# Patient Record
Sex: FEMALE | Race: WHITE | NOT HISPANIC OR LATINO
[De-identification: names, ages, dates, MRNs, and addresses within clinical notes are randomized per-mention and may not be internally consistent; named-entity substitution may affect disease eponyms.]

---

## 2017-07-25 PROBLEM — Z00.00 ENCOUNTER FOR PREVENTIVE HEALTH EXAMINATION: Status: ACTIVE | Noted: 2017-07-25

## 2017-09-08 ENCOUNTER — APPOINTMENT (OUTPATIENT)
Dept: VASCULAR SURGERY | Facility: CLINIC | Age: 42
End: 2017-09-08
Payer: COMMERCIAL

## 2017-09-08 VITALS
SYSTOLIC BLOOD PRESSURE: 122 MMHG | DIASTOLIC BLOOD PRESSURE: 82 MMHG | HEIGHT: 64 IN | HEART RATE: 48 BPM | OXYGEN SATURATION: 100 % | BODY MASS INDEX: 28.17 KG/M2 | WEIGHT: 165 LBS

## 2017-09-08 DIAGNOSIS — Z78.9 OTHER SPECIFIED HEALTH STATUS: ICD-10-CM

## 2017-09-08 DIAGNOSIS — Z82.49 FAMILY HISTORY OF ISCHEMIC HEART DISEASE AND OTHER DISEASES OF THE CIRCULATORY SYSTEM: ICD-10-CM

## 2017-09-08 DIAGNOSIS — Z86.39 PERSONAL HISTORY OF OTHER ENDOCRINE, NUTRITIONAL AND METABOLIC DISEASE: ICD-10-CM

## 2017-09-08 DIAGNOSIS — I83.10 VARICOSE VEINS OF UNSPECIFIED LOWER EXTREMITY WITH INFLAMMATION: ICD-10-CM

## 2017-09-08 DIAGNOSIS — F17.200 NICOTINE DEPENDENCE, UNSPECIFIED, UNCOMPLICATED: ICD-10-CM

## 2017-09-08 PROCEDURE — 99203 OFFICE O/P NEW LOW 30 MIN: CPT | Mod: 25

## 2017-09-08 PROCEDURE — 93970 EXTREMITY STUDY: CPT

## 2017-09-11 PROBLEM — Z78.9 SOCIAL ALCOHOL USE: Status: ACTIVE | Noted: 2017-09-11

## 2017-09-11 PROBLEM — Z86.39 HISTORY OF HYPOTHYROIDISM: Status: RESOLVED | Noted: 2017-09-11 | Resolved: 2017-09-11

## 2017-09-11 PROBLEM — F17.200 CURRENT SOME DAY SMOKER: Status: ACTIVE | Noted: 2017-09-11

## 2017-09-11 PROBLEM — Z82.49 FAMILY HISTORY OF VARICOSE VEINS: Status: ACTIVE | Noted: 2017-09-11

## 2017-11-16 ENCOUNTER — EMERGENCY (EMERGENCY)
Facility: HOSPITAL | Age: 42
LOS: 1 days | Discharge: HOME | End: 2017-11-16
Attending: EMERGENCY MEDICINE
Payer: COMMERCIAL

## 2017-11-16 VITALS
DIASTOLIC BLOOD PRESSURE: 88 MMHG | HEART RATE: 67 BPM | WEIGHT: 164.91 LBS | TEMPERATURE: 98 F | HEIGHT: 64 IN | SYSTOLIC BLOOD PRESSURE: 139 MMHG | OXYGEN SATURATION: 99 % | RESPIRATION RATE: 19 BRPM
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
## 2017-11-16 DIAGNOSIS — M54.9 DORSALGIA, UNSPECIFIED: ICD-10-CM

## 2017-11-16 DIAGNOSIS — S22.41XA MULTIPLE FRACTURES OF RIBS, RIGHT SIDE, INITIAL ENCOUNTER FOR CLOSED FRACTURE: ICD-10-CM
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
## 2017-11-16 LAB
APPEARANCE UR: CLEAR — SIGNIFICANT CHANGE UP
BILIRUB UR-MCNC: NEGATIVE — SIGNIFICANT CHANGE UP
COLOR SPEC: YELLOW — SIGNIFICANT CHANGE UP
DIFF PNL FLD: ABNORMAL
GLUCOSE UR QL: NEGATIVE — SIGNIFICANT CHANGE UP
HCG UR QL: NEGATIVE — SIGNIFICANT CHANGE UP
KETONES UR-MCNC: NEGATIVE — SIGNIFICANT CHANGE UP
LEUKOCYTE ESTERASE UR-ACNC: ABNORMAL
NITRITE UR-MCNC: NEGATIVE — SIGNIFICANT CHANGE UP
PH UR: 6 — SIGNIFICANT CHANGE UP (ref 5–8)
PROT UR-MCNC: NEGATIVE — SIGNIFICANT CHANGE UP
SP GR SPEC: 1 — LOW (ref 1.01–1.02)
UROBILINOGEN FLD QL: NEGATIVE — SIGNIFICANT CHANGE UP

## 2017-11-16 PROCEDURE — 81025 URINE PREGNANCY TEST: CPT

## 2017-11-16 PROCEDURE — 99284 EMERGENCY DEPT VISIT MOD MDM: CPT | Mod: 25

## 2017-11-16 PROCEDURE — 71250 CT THORAX DX C-: CPT

## 2017-11-16 PROCEDURE — 81001 URINALYSIS AUTO W/SCOPE: CPT

## 2017-11-16 PROCEDURE — 71250 CT THORAX DX C-: CPT | Mod: 26

## 2017-11-16 PROCEDURE — 87086 URINE CULTURE/COLONY COUNT: CPT

## 2017-11-16 RX ORDER — ACETAMINOPHEN 500 MG
2 TABLET ORAL
Qty: 48 | Refills: 0
Start: 2017-11-16 | End: 2017-11-20

## 2017-11-16 RX ORDER — OXYCODONE AND ACETAMINOPHEN 5; 325 MG/1; MG/1
1 TABLET ORAL ONCE
Qty: 0 | Refills: 0 | Status: DISCONTINUED | OUTPATIENT
Start: 2017-11-16 | End: 2017-11-16

## 2017-11-16 RX ADMIN — OXYCODONE AND ACETAMINOPHEN 1 TABLET(S): 5; 325 TABLET ORAL at 02:17

## 2017-11-16 NOTE — ED PROVIDER NOTE - OBJECTIVE STATEMENT
41 y/o F with no significant PMHx and PSHx of  presents to ED c/o back pain s/p slip and fall in bath tub today. Pt describes pain to be sharp, worse when moving. Pt denies any head trauma, LOC, incontinence, numbness, weakness, dizziness or any other complaints. NKDA. LMP: 1 month ago

## 2017-11-17 LAB
CULTURE RESULTS: NO GROWTH — SIGNIFICANT CHANGE UP
SPECIMEN SOURCE: SIGNIFICANT CHANGE UP

## 2018-07-25 PROBLEM — I83.10 VARICOSE VEINS WITH INFLAMMATION, UNSPECIFIED LATERALITY: Status: ACTIVE | Noted: 2017-09-11

## 2019-01-23 ENCOUNTER — APPOINTMENT (OUTPATIENT)
Dept: SURGICAL ONCOLOGY | Facility: CLINIC | Age: 44
End: 2019-01-23
Payer: COMMERCIAL

## 2019-01-23 VITALS
WEIGHT: 164 LBS | DIASTOLIC BLOOD PRESSURE: 88 MMHG | OXYGEN SATURATION: 100 % | HEART RATE: 57 BPM | BODY MASS INDEX: 28 KG/M2 | SYSTOLIC BLOOD PRESSURE: 131 MMHG | HEIGHT: 64 IN

## 2019-01-23 DIAGNOSIS — F17.200 NICOTINE DEPENDENCE, UNSPECIFIED, UNCOMPLICATED: ICD-10-CM

## 2019-01-23 DIAGNOSIS — Z86.73 PERSONAL HISTORY OF TRANSIENT ISCHEMIC ATTACK (TIA), AND CEREBRAL INFARCTION W/OUT RESIDUAL DEFICITS: ICD-10-CM

## 2019-01-23 DIAGNOSIS — Z87.898 PERSONAL HISTORY OF OTHER SPECIFIED CONDITIONS: ICD-10-CM

## 2019-01-23 DIAGNOSIS — I83.899 VARICOSE VEINS OF UNSPECIFIED LOWER EXTREMITY WITH OTHER COMPLICATIONS: ICD-10-CM

## 2019-01-23 DIAGNOSIS — Z86.79 PERSONAL HISTORY OF OTHER DISEASES OF THE CIRCULATORY SYSTEM: ICD-10-CM

## 2019-01-23 DIAGNOSIS — M79.606 PAIN IN LEG, UNSPECIFIED: ICD-10-CM

## 2019-01-23 DIAGNOSIS — Z78.9 OTHER SPECIFIED HEALTH STATUS: ICD-10-CM

## 2019-01-23 PROCEDURE — 99205 OFFICE O/P NEW HI 60 MIN: CPT

## 2019-01-23 RX ORDER — MULTIVIT-MIN/FOLIC/VIT K/LYCOP 400-300MCG
25 MCG TABLET ORAL
Refills: 0 | Status: ACTIVE | COMMUNITY

## 2019-01-23 RX ORDER — LEVOTHYROXINE SODIUM 150 UG/1
150 TABLET ORAL
Refills: 0 | Status: ACTIVE | COMMUNITY

## 2019-01-23 NOTE — REASON FOR VISIT
[Initial Consultation] : an initial consultation for [Abnormal Mammogram] : abnormal mammogram [Spouse] : spouse

## 2019-01-23 NOTE — ASSESSMENT
[FreeTextEntry1] : Impression:\par 0.6cm mass Right breast 2:00 on mammogram and sonogram\par \par \par Plan:\par US guided biopsy of Right breast mass\par

## 2019-01-23 NOTE — CONSULT LETTER
[Dear  ___] : Dear  [unfilled], [Consult Letter:] : I had the pleasure of evaluating your patient, [unfilled]. [Please see my note below.] : Please see my note below. [Consult Closing:] : Thank you very much for allowing me to participate in the care of this patient.  If you have any questions, please do not hesitate to contact me. [Sincerely,] : Sincerely, [FreeTextEntry1] : I will keep you informed of the pathology of the biopsy. [FreeTextEntry3] : Lei Lucero MD FACS\par Chief of Surgical Oncology\par \par

## 2019-01-23 NOTE — PHYSICAL EXAM
[Normal] : supple, no neck mass and thyroid not enlarged [Normal Supraclavicular Lymph Nodes] : normal supraclavicular lymph nodes [Normal Axillary Lymph Nodes] : normal axillary lymph nodes [Normal] : oriented to person, place and time, with appropriate affect [de-identified] : Fibrocystic changes but no masses.  [FreeTextEntry1] : Deferred

## 2019-01-23 NOTE — HISTORY OF PRESENT ILLNESS
[de-identified] : Ms. Tsai is a 43 year old female who presents today for an initial consultation.  \par \par She went for her annual breast imaging including mammo/sono 2018 and was noted with a 0.6cm irregular nodular density at the 2:00 position Right breast for which US guided biopsy was recommended (Birads 4).  She has yet to undergo the biopsy.  Today she is without any complaints. Denies palpable breast masses, nipple discharge, skin changes, inversion or breast pain. Denies constitutional symptoms.\par  \par PMH otherwise significant for hypothyroidism.\par Denies family history of breast or ovarian cancer.  Father with history of gastric cancer.\par \par Menarche: 10 y.o.\par  \par Age at first pregnancy: 26 y.o.\par \par Internist: Dr. Clive Rudd

## 2019-01-30 ENCOUNTER — RESULT REVIEW (OUTPATIENT)
Age: 44
End: 2019-01-30

## 2019-01-30 ENCOUNTER — OUTPATIENT (OUTPATIENT)
Dept: OUTPATIENT SERVICES | Facility: HOSPITAL | Age: 44
LOS: 1 days | End: 2019-01-30
Payer: COMMERCIAL

## 2019-01-30 ENCOUNTER — APPOINTMENT (OUTPATIENT)
Dept: ULTRASOUND IMAGING | Facility: CLINIC | Age: 44
End: 2019-01-30

## 2019-01-30 DIAGNOSIS — Z00.8 ENCOUNTER FOR OTHER GENERAL EXAMINATION: ICD-10-CM

## 2019-01-30 PROCEDURE — A4648: CPT

## 2019-01-30 PROCEDURE — 77065 DX MAMMO INCL CAD UNI: CPT

## 2019-01-30 PROCEDURE — 77065 DX MAMMO INCL CAD UNI: CPT | Mod: 26,RT

## 2019-01-30 PROCEDURE — 19083 BX BREAST 1ST LESION US IMAG: CPT | Mod: RT

## 2019-01-30 PROCEDURE — 19083 BX BREAST 1ST LESION US IMAG: CPT

## 2019-09-26 NOTE — ED ADULT TRIAGE NOTE - CCCP TRG CHIEF CMPLNT
[employer]  or [employer]  The above information is an  only  It is not intended as medical advice for individual conditions or treatments  Talk to your doctor, nurse or pharmacist before following any medical regimen to see if it is safe and effective for you  s/p fall hit her right side/back at the tub no loc/fall

## 2020-01-16 NOTE — ED ADULT NURSE NOTE - NS ED NOTE ABUSE SUSPICION NEGLECT YN
From: Padmini Pearson  To: Srikanth Mark MD  Sent: 1/14/2020 6:37 PM CST  Subject: After-Visit Question    Could I get a valved holding chamber VHC spacer to use with my inhaler ? it was suggested by [facility]. Does that just get picked up at the pharmacy if you call it in?     Thank you!    Rebecca Pearson   No

## 2020-02-14 ENCOUNTER — APPOINTMENT (OUTPATIENT)
Dept: MAMMOGRAPHY | Facility: IMAGING CENTER | Age: 45
End: 2020-02-14
Payer: COMMERCIAL

## 2020-02-14 ENCOUNTER — OUTPATIENT (OUTPATIENT)
Dept: OUTPATIENT SERVICES | Facility: HOSPITAL | Age: 45
LOS: 1 days | End: 2020-02-14
Payer: COMMERCIAL

## 2020-02-14 ENCOUNTER — APPOINTMENT (OUTPATIENT)
Dept: ULTRASOUND IMAGING | Facility: IMAGING CENTER | Age: 45
End: 2020-02-14
Payer: COMMERCIAL

## 2020-02-14 DIAGNOSIS — Z00.8 ENCOUNTER FOR OTHER GENERAL EXAMINATION: ICD-10-CM

## 2020-02-14 PROCEDURE — G0279: CPT | Mod: 26

## 2020-02-14 PROCEDURE — 77067 SCR MAMMO BI INCL CAD: CPT

## 2020-02-14 PROCEDURE — 76641 ULTRASOUND BREAST COMPLETE: CPT | Mod: 26,50

## 2020-02-14 PROCEDURE — 76641 ULTRASOUND BREAST COMPLETE: CPT

## 2020-02-14 PROCEDURE — 77066 DX MAMMO INCL CAD BI: CPT

## 2020-02-14 PROCEDURE — 77063 BREAST TOMOSYNTHESIS BI: CPT

## 2020-02-14 PROCEDURE — 77066 DX MAMMO INCL CAD BI: CPT | Mod: 26

## 2020-02-14 PROCEDURE — G0279: CPT

## 2020-02-15 ENCOUNTER — TRANSCRIPTION ENCOUNTER (OUTPATIENT)
Age: 45
End: 2020-02-15

## 2020-02-24 ENCOUNTER — OUTPATIENT (OUTPATIENT)
Dept: OUTPATIENT SERVICES | Facility: HOSPITAL | Age: 45
LOS: 1 days | End: 2020-02-24
Payer: COMMERCIAL

## 2020-02-24 ENCOUNTER — APPOINTMENT (OUTPATIENT)
Dept: MAMMOGRAPHY | Facility: IMAGING CENTER | Age: 45
End: 2020-02-24
Payer: COMMERCIAL

## 2020-02-24 ENCOUNTER — RESULT REVIEW (OUTPATIENT)
Age: 45
End: 2020-02-24

## 2020-02-24 ENCOUNTER — APPOINTMENT (OUTPATIENT)
Dept: ULTRASOUND IMAGING | Facility: IMAGING CENTER | Age: 45
End: 2020-02-24
Payer: COMMERCIAL

## 2020-02-24 DIAGNOSIS — Z00.8 ENCOUNTER FOR OTHER GENERAL EXAMINATION: ICD-10-CM

## 2020-02-24 PROCEDURE — 88305 TISSUE EXAM BY PATHOLOGIST: CPT | Mod: 26

## 2020-02-24 PROCEDURE — 88173 CYTOPATH EVAL FNA REPORT: CPT | Mod: 26

## 2020-02-24 PROCEDURE — 88305 TISSUE EXAM BY PATHOLOGIST: CPT

## 2020-02-24 PROCEDURE — 19000 PUNCTURE ASPIR CYST BREAST: CPT

## 2020-02-24 PROCEDURE — 19000 PUNCTURE ASPIR CYST BREAST: CPT | Mod: LT

## 2020-02-24 PROCEDURE — 76942 ECHO GUIDE FOR BIOPSY: CPT

## 2020-02-24 PROCEDURE — 76942 ECHO GUIDE FOR BIOPSY: CPT | Mod: 26

## 2020-02-24 PROCEDURE — 19001 PUNCTURE ASPIR CYST BRST EA: CPT

## 2020-02-24 PROCEDURE — 88173 CYTOPATH EVAL FNA REPORT: CPT

## 2020-11-04 ENCOUNTER — APPOINTMENT (OUTPATIENT)
Dept: ULTRASOUND IMAGING | Facility: CLINIC | Age: 45
End: 2020-11-04

## 2020-11-04 ENCOUNTER — APPOINTMENT (OUTPATIENT)
Dept: ULTRASOUND IMAGING | Facility: CLINIC | Age: 45
End: 2020-11-04
Payer: COMMERCIAL

## 2020-11-04 ENCOUNTER — OUTPATIENT (OUTPATIENT)
Dept: OUTPATIENT SERVICES | Facility: HOSPITAL | Age: 45
LOS: 1 days | End: 2020-11-04
Payer: COMMERCIAL

## 2020-11-04 DIAGNOSIS — E04.1 NONTOXIC SINGLE THYROID NODULE: ICD-10-CM

## 2020-11-04 DIAGNOSIS — Z00.8 ENCOUNTER FOR OTHER GENERAL EXAMINATION: ICD-10-CM

## 2020-11-04 DIAGNOSIS — E07.9 DISORDER OF THYROID, UNSPECIFIED: ICD-10-CM

## 2020-11-04 PROCEDURE — 77065 DX MAMMO INCL CAD UNI: CPT

## 2020-11-04 PROCEDURE — G0279: CPT

## 2020-11-04 PROCEDURE — 76536 US EXAM OF HEAD AND NECK: CPT

## 2020-11-04 PROCEDURE — 77065 DX MAMMO INCL CAD UNI: CPT | Mod: 26,LT

## 2020-11-04 PROCEDURE — G0279: CPT | Mod: 26

## 2020-11-04 PROCEDURE — 76642 ULTRASOUND BREAST LIMITED: CPT | Mod: 26,LT

## 2020-11-04 PROCEDURE — 76536 US EXAM OF HEAD AND NECK: CPT | Mod: 26

## 2020-11-04 PROCEDURE — 76642 ULTRASOUND BREAST LIMITED: CPT

## 2020-11-10 ENCOUNTER — APPOINTMENT (OUTPATIENT)
Dept: ULTRASOUND IMAGING | Facility: CLINIC | Age: 45
End: 2020-11-10

## 2020-11-10 ENCOUNTER — OUTPATIENT (OUTPATIENT)
Dept: OUTPATIENT SERVICES | Facility: HOSPITAL | Age: 45
LOS: 1 days | End: 2020-11-10
Payer: COMMERCIAL

## 2020-11-10 ENCOUNTER — RESULT REVIEW (OUTPATIENT)
Age: 45
End: 2020-11-10

## 2020-11-10 ENCOUNTER — APPOINTMENT (OUTPATIENT)
Dept: MAMMOGRAPHY | Facility: CLINIC | Age: 45
End: 2020-11-10
Payer: COMMERCIAL

## 2020-11-10 DIAGNOSIS — R92.8 OTHER ABNORMAL AND INCONCLUSIVE FINDINGS ON DIAGNOSTIC IMAGING OF BREAST: ICD-10-CM

## 2020-11-10 PROCEDURE — A4648: CPT

## 2020-11-10 PROCEDURE — 88305 TISSUE EXAM BY PATHOLOGIST: CPT

## 2020-11-10 PROCEDURE — 77065 DX MAMMO INCL CAD UNI: CPT | Mod: 26,LT

## 2020-11-10 PROCEDURE — 19081 BX BREAST 1ST LESION STRTCTC: CPT | Mod: LT

## 2020-11-10 PROCEDURE — 88305 TISSUE EXAM BY PATHOLOGIST: CPT | Mod: 26

## 2020-11-10 PROCEDURE — 77065 DX MAMMO INCL CAD UNI: CPT

## 2020-11-10 PROCEDURE — 19081 BX BREAST 1ST LESION STRTCTC: CPT

## 2020-11-16 ENCOUNTER — APPOINTMENT (OUTPATIENT)
Dept: SURGICAL ONCOLOGY | Facility: CLINIC | Age: 45
End: 2020-11-16
Payer: COMMERCIAL

## 2020-11-16 VITALS
RESPIRATION RATE: 15 BRPM | OXYGEN SATURATION: 99 % | BODY MASS INDEX: 28.34 KG/M2 | WEIGHT: 166 LBS | SYSTOLIC BLOOD PRESSURE: 125 MMHG | DIASTOLIC BLOOD PRESSURE: 86 MMHG | HEART RATE: 70 BPM | HEIGHT: 64 IN

## 2020-11-16 PROCEDURE — 99072 ADDL SUPL MATRL&STAF TM PHE: CPT

## 2020-11-16 PROCEDURE — 99214 OFFICE O/P EST MOD 30 MIN: CPT

## 2020-11-16 NOTE — ASSESSMENT
[FreeTextEntry1] : Impression:\par Increasing Left breast calcifications on breast imaging\par S/p stereotactic core bx 11/5/2020- Path: ADH\par \par \par Plan:\par Left ute  localized lumpectomy \par

## 2020-11-16 NOTE — CONSULT LETTER
[Dear  ___] : Dear  [unfilled], [Courtesy Letter:] : I had the pleasure of seeing your patient, [unfilled], in my office today. [Please see my note below.] : Please see my note below. [Consult Closing:] : Thank you very much for allowing me to participate in the care of this patient.  If you have any questions, please do not hesitate to contact me. [Sincerely,] : Sincerely, [FreeTextEntry1] : I will keep you informed of the final pathology. [FreeTextEntry3] : Lei Lucero MD FACS\par Chief of Surgical Oncology\par \par

## 2020-11-16 NOTE — HISTORY OF PRESENT ILLNESS
[de-identified] : Ms. Tsai is a 45 year old female who presents today for a follow up visit. \par \par She was seen in initial consultation in 2019 for a right breast mass.    She went for her annual breast imaging including mammo/sono 2018 and was noted with a 0.6cm irregular nodular density at the 2:00 position Right breast for which US guided biopsy was recommended (Birads 4).   She is s/p right breast 2:00, 3cmfn US guided core biopsy on 2020- Path: Benign breast tissue with dense fibrosis.   \par \par Bilateral mammogram/sonogram 2020- IMPRESSION:  1). Developing nodular asymmetry far upper posterior left breast, likely corresponding to one of the new deeply located complicated cyst left breast on ultrasound at 1-2:00 or 2:00. Further evaluation by ultrasound-guided cyst aspirations and postprocedure mammogram to ascertain correlation is recommended. If correlation is not established, then tomosynthesis guided stereotactic core biopsy is recommended for the mammographic asymmetry.  2) Probable benign loosely grouped calcifications left upper outer posterior breast, for which a six-month follow-up left diagnostic mammogram recommended to ascertain radiographic stability.  3.) Probable benign cluster of microcysts left breast at 2:00, for which a six-month follow-up ultrasound recommended to ascertain stability.  RECOMMENDATION:  Ultrasound biopsy.  BI-RADS 4A  - Suspicious Finding(s) -Low Suspicion for Malignancy\par \par  She is s/p left breast 2:00 FNA on 2020 -Path: Negative for malignant cells; compatible with apocrine metaplastic cyst contents. \par \par Diagnostic left mammogram/sonogram 2020- Interval increase in calcifications within the deep posterior left upper outer quadrant, biopsy is advised.  BIRADS 4A\par \par  Pt. is s/p left posterior upper outer breast stereotactic core biopsy on 11/10/2020.  Pathology revealed Atypical ductal hyperplasia (ADH) in background of columnar cell change and columnar cell hyperplasia associated with calcifications.  Fibrocystic changes including stromal fibrosis, microcyst formation and adenosis associated with microcalcifications.  \par \par Denies palpable breast masses or nipple discharge. \par \par PERTINENT HISTORY:\par  PMH otherwise significant for hypothyroidism.\par Denies family history of breast or ovarian cancer.  Father with history of gastric cancer.\par \par Menarche: 10 y.o.\par  \par Age at first pregnancy: 26 y.o.\par \par Internist: Dr. Clive Rudd

## 2020-11-16 NOTE — PHYSICAL EXAM
[Normal] : supple, no neck mass and thyroid not enlarged [Normal Supraclavicular Lymph Nodes] : normal supraclavicular lymph nodes [Normal Axillary Lymph Nodes] : normal axillary lymph nodes [Normal] : oriented to person, place and time, with appropriate affect [de-identified] : Fibrocystic changes but no massses

## 2020-11-24 ENCOUNTER — OUTPATIENT (OUTPATIENT)
Dept: OUTPATIENT SERVICES | Facility: HOSPITAL | Age: 45
LOS: 1 days | End: 2020-11-24

## 2020-11-24 VITALS
HEART RATE: 64 BPM | DIASTOLIC BLOOD PRESSURE: 80 MMHG | OXYGEN SATURATION: 98 % | SYSTOLIC BLOOD PRESSURE: 122 MMHG | WEIGHT: 179.02 LBS | TEMPERATURE: 98 F | RESPIRATION RATE: 20 BRPM | HEIGHT: 64 IN

## 2020-11-24 DIAGNOSIS — Z98.890 OTHER SPECIFIED POSTPROCEDURAL STATES: Chronic | ICD-10-CM

## 2020-11-24 DIAGNOSIS — H60.92 UNSPECIFIED OTITIS EXTERNA, LEFT EAR: ICD-10-CM

## 2020-11-24 DIAGNOSIS — N60.92 UNSPECIFIED BENIGN MAMMARY DYSPLASIA OF LEFT BREAST: ICD-10-CM

## 2020-11-24 DIAGNOSIS — E66.9 OBESITY, UNSPECIFIED: Chronic | ICD-10-CM

## 2020-11-24 DIAGNOSIS — Z98.891 HISTORY OF UTERINE SCAR FROM PREVIOUS SURGERY: Chronic | ICD-10-CM

## 2020-11-24 DIAGNOSIS — Z87.59 PERSONAL HISTORY OF OTHER COMPLICATIONS OF PREGNANCY, CHILDBIRTH AND THE PUERPERIUM: Chronic | ICD-10-CM

## 2020-11-24 DIAGNOSIS — N63.20 UNSPECIFIED LUMP IN THE LEFT BREAST, UNSPECIFIED QUADRANT: ICD-10-CM

## 2020-11-24 LAB
ANION GAP SERPL CALC-SCNC: 10 MMO/L — SIGNIFICANT CHANGE UP (ref 7–14)
BUN SERPL-MCNC: 14 MG/DL — SIGNIFICANT CHANGE UP (ref 7–23)
CALCIUM SERPL-MCNC: 9.5 MG/DL — SIGNIFICANT CHANGE UP (ref 8.4–10.5)
CHLORIDE SERPL-SCNC: 101 MMOL/L — SIGNIFICANT CHANGE UP (ref 98–107)
CO2 SERPL-SCNC: 28 MMOL/L — SIGNIFICANT CHANGE UP (ref 22–31)
CREAT SERPL-MCNC: 0.76 MG/DL — SIGNIFICANT CHANGE UP (ref 0.5–1.3)
GLUCOSE SERPL-MCNC: 68 MG/DL — LOW (ref 70–99)
HCG UR-SCNC: NEGATIVE — SIGNIFICANT CHANGE UP
HCT VFR BLD CALC: 44.7 % — SIGNIFICANT CHANGE UP (ref 34.5–45)
HGB BLD-MCNC: 14.7 G/DL — SIGNIFICANT CHANGE UP (ref 11.5–15.5)
MCHC RBC-ENTMCNC: 28.5 PG — SIGNIFICANT CHANGE UP (ref 27–34)
MCHC RBC-ENTMCNC: 32.9 % — SIGNIFICANT CHANGE UP (ref 32–36)
MCV RBC AUTO: 86.8 FL — SIGNIFICANT CHANGE UP (ref 80–100)
NRBC # FLD: 0 K/UL — SIGNIFICANT CHANGE UP (ref 0–0)
PLATELET # BLD AUTO: 392 K/UL — SIGNIFICANT CHANGE UP (ref 150–400)
PMV BLD: 9.9 FL — SIGNIFICANT CHANGE UP (ref 7–13)
POTASSIUM SERPL-MCNC: 3.7 MMOL/L — SIGNIFICANT CHANGE UP (ref 3.5–5.3)
POTASSIUM SERPL-SCNC: 3.7 MMOL/L — SIGNIFICANT CHANGE UP (ref 3.5–5.3)
RBC # BLD: 5.15 M/UL — SIGNIFICANT CHANGE UP (ref 3.8–5.2)
RBC # FLD: 12.2 % — SIGNIFICANT CHANGE UP (ref 10.3–14.5)
SODIUM SERPL-SCNC: 139 MMOL/L — SIGNIFICANT CHANGE UP (ref 135–145)
SP GR UR: 1.02 — SIGNIFICANT CHANGE UP (ref 1–1.04)
WBC # BLD: 7.57 K/UL — SIGNIFICANT CHANGE UP (ref 3.8–10.5)
WBC # FLD AUTO: 7.57 K/UL — SIGNIFICANT CHANGE UP (ref 3.8–10.5)

## 2020-11-24 RX ORDER — LEVOTHYROXINE SODIUM 125 MCG
0 TABLET ORAL
Qty: 0 | Refills: 0 | DISCHARGE

## 2020-11-24 NOTE — H&P PST ADULT - NSANTHOSAYNRD_GEN_A_CORE
No. ADELINA screening performed.  STOP BANG Legend: 0-2 = LOW Risk; 3-4 = INTERMEDIATE Risk; 5-8 = HIGH Risk

## 2020-11-24 NOTE — H&P PST ADULT - NSICDXPROBLEM_GEN_ALL_CORE_FT
PROBLEM DIAGNOSES  Problem: Left breast mass  Assessment and Plan: Pt. is scheduled for left breast ute  localized lumpectomy 12/2/20.  Pt. verbalized understanding of instructions and that Chlorhexidine is for external use.  Pt. to call Surgeon's office to schedule COVID test.

## 2020-11-24 NOTE — H&P PST ADULT - NSICDXPASTSURGICALHX_GEN_ALL_CORE_FT
PAST SURGICAL HISTORY:  H/O 2 abortions ,     H/O  section 2006    H/O right breast biopsy 2019    S/P fine needle aspiration left breast-2020     PAST SURGICAL HISTORY:  H/O 2 abortions ,     H/O  section 2006    H/O right breast biopsy 2019    Obese     S/P fine needle aspiration left breast-2020

## 2020-11-24 NOTE — H&P PST ADULT - NSICDXPASTMEDICALHX_GEN_ALL_CORE_FT
PAST MEDICAL HISTORY:  Hypothyroidism      PAST MEDICAL HISTORY:  Hypothyroidism     Left breast mass

## 2020-11-29 ENCOUNTER — APPOINTMENT (OUTPATIENT)
Dept: DISASTER EMERGENCY | Facility: CLINIC | Age: 45
End: 2020-11-29

## 2020-11-29 DIAGNOSIS — Z01.818 ENCOUNTER FOR OTHER PREPROCEDURAL EXAMINATION: ICD-10-CM

## 2020-11-30 ENCOUNTER — OUTPATIENT (OUTPATIENT)
Dept: OUTPATIENT SERVICES | Facility: HOSPITAL | Age: 45
LOS: 1 days | End: 2020-11-30
Payer: COMMERCIAL

## 2020-11-30 ENCOUNTER — APPOINTMENT (OUTPATIENT)
Dept: MAMMOGRAPHY | Facility: IMAGING CENTER | Age: 45
End: 2020-11-30
Payer: COMMERCIAL

## 2020-11-30 ENCOUNTER — RESULT REVIEW (OUTPATIENT)
Age: 45
End: 2020-11-30

## 2020-11-30 DIAGNOSIS — Z98.891 HISTORY OF UTERINE SCAR FROM PREVIOUS SURGERY: Chronic | ICD-10-CM

## 2020-11-30 DIAGNOSIS — Z87.59 PERSONAL HISTORY OF OTHER COMPLICATIONS OF PREGNANCY, CHILDBIRTH AND THE PUERPERIUM: Chronic | ICD-10-CM

## 2020-11-30 DIAGNOSIS — E66.9 OBESITY, UNSPECIFIED: Chronic | ICD-10-CM

## 2020-11-30 DIAGNOSIS — Z00.8 ENCOUNTER FOR OTHER GENERAL EXAMINATION: ICD-10-CM

## 2020-11-30 DIAGNOSIS — Z98.890 OTHER SPECIFIED POSTPROCEDURAL STATES: Chronic | ICD-10-CM

## 2020-11-30 PROBLEM — E03.9 HYPOTHYROIDISM, UNSPECIFIED: Chronic | Status: ACTIVE | Noted: 2020-11-24

## 2020-11-30 PROBLEM — N63.20 UNSPECIFIED LUMP IN THE LEFT BREAST, UNSPECIFIED QUADRANT: Chronic | Status: ACTIVE | Noted: 2020-11-24

## 2020-11-30 LAB — SARS-COV-2 N GENE NPH QL NAA+PROBE: NOT DETECTED

## 2020-11-30 PROCEDURE — 19281 PERQ DEVICE BREAST 1ST IMAG: CPT | Mod: LT

## 2020-11-30 PROCEDURE — 19281 PERQ DEVICE BREAST 1ST IMAG: CPT

## 2020-11-30 PROCEDURE — C1739: CPT

## 2020-12-01 ENCOUNTER — TRANSCRIPTION ENCOUNTER (OUTPATIENT)
Age: 45
End: 2020-12-01

## 2020-12-01 VITALS
OXYGEN SATURATION: 100 % | SYSTOLIC BLOOD PRESSURE: 119 MMHG | DIASTOLIC BLOOD PRESSURE: 70 MMHG | RESPIRATION RATE: 17 BRPM | HEART RATE: 63 BPM | HEIGHT: 64 IN | TEMPERATURE: 98 F | WEIGHT: 179.02 LBS

## 2020-12-02 ENCOUNTER — OUTPATIENT (OUTPATIENT)
Dept: OUTPATIENT SERVICES | Facility: HOSPITAL | Age: 45
LOS: 1 days | Discharge: HOME | End: 2020-12-02
Payer: COMMERCIAL

## 2020-12-02 ENCOUNTER — RESULT REVIEW (OUTPATIENT)
Age: 45
End: 2020-12-02

## 2020-12-02 ENCOUNTER — OUTPATIENT (OUTPATIENT)
Dept: OUTPATIENT SERVICES | Facility: HOSPITAL | Age: 45
LOS: 1 days | End: 2020-12-02
Payer: COMMERCIAL

## 2020-12-02 ENCOUNTER — APPOINTMENT (OUTPATIENT)
Dept: SURGICAL ONCOLOGY | Facility: AMBULATORY SURGERY CENTER | Age: 45
End: 2020-12-02

## 2020-12-02 ENCOUNTER — APPOINTMENT (OUTPATIENT)
Dept: MAMMOGRAPHY | Facility: IMAGING CENTER | Age: 45
End: 2020-12-02
Payer: COMMERCIAL

## 2020-12-02 VITALS — HEART RATE: 72 BPM | OXYGEN SATURATION: 98 % | RESPIRATION RATE: 16 BRPM

## 2020-12-02 DIAGNOSIS — N60.92 UNSPECIFIED BENIGN MAMMARY DYSPLASIA OF LEFT BREAST: ICD-10-CM

## 2020-12-02 DIAGNOSIS — Z98.890 OTHER SPECIFIED POSTPROCEDURAL STATES: Chronic | ICD-10-CM

## 2020-12-02 DIAGNOSIS — R92.8 OTHER ABNORMAL AND INCONCLUSIVE FINDINGS ON DIAGNOSTIC IMAGING OF BREAST: ICD-10-CM

## 2020-12-02 DIAGNOSIS — E66.9 OBESITY, UNSPECIFIED: Chronic | ICD-10-CM

## 2020-12-02 DIAGNOSIS — Z87.59 PERSONAL HISTORY OF OTHER COMPLICATIONS OF PREGNANCY, CHILDBIRTH AND THE PUERPERIUM: Chronic | ICD-10-CM

## 2020-12-02 DIAGNOSIS — Z98.891 HISTORY OF UTERINE SCAR FROM PREVIOUS SURGERY: Chronic | ICD-10-CM

## 2020-12-02 PROCEDURE — 76098 X-RAY EXAM SURGICAL SPECIMEN: CPT

## 2020-12-02 PROCEDURE — 88305 TISSUE EXAM BY PATHOLOGIST: CPT | Mod: 26

## 2020-12-02 PROCEDURE — 76098 X-RAY EXAM SURGICAL SPECIMEN: CPT | Mod: 26

## 2020-12-02 PROCEDURE — 88307 TISSUE EXAM BY PATHOLOGIST: CPT | Mod: 26

## 2020-12-02 PROCEDURE — 19301 PARTIAL MASTECTOMY: CPT

## 2020-12-02 RX ORDER — LEVOTHYROXINE SODIUM 125 MCG
1 TABLET ORAL
Qty: 0 | Refills: 0 | DISCHARGE

## 2020-12-02 RX ORDER — ASCORBIC ACID 60 MG
2 TABLET,CHEWABLE ORAL
Qty: 0 | Refills: 0 | DISCHARGE

## 2020-12-02 RX ORDER — CHOLECALCIFEROL (VITAMIN D3) 125 MCG
1 CAPSULE ORAL
Qty: 0 | Refills: 0 | DISCHARGE

## 2020-12-02 NOTE — ASU PREOP CHECKLIST - VIA
Lab Hours:  Monday-Thursday 7-6  Friday 7-5  Saturday 7-12  Do not eat any food or drink any beverages for 12 hours before having the testing done. You may have water only; NO candy, gum, mints, coffee, soda or juice. Please take all medications as usual. Do not drink any alcoholic beverages for 24 hours prior to testing. If your physician has ordered laboratory or radiological testing as a part of your ongoing plan of care, please allow 5-7 business days for the results to be sent and reviewed by your provider. You may receive test results in [facility] before your physician has had a chance to review them. If your results are critical and require more immediate intervention, you will be contacted sooner. Your results will be conveyed via phone call or letter. If you need a refill on your prescription, please call your pharmacy and let them know. Please be proactive and call before your medication runs out. The pharmacy will then contact us for a refill. Please allow 24-48 hours for the refill to be processed. In the next few weeks you may receive a [employer] survey regarding your most recent clinic visit with us. Please take a few moments out of your day to accurately evaluate your visit. We strive to provide you with the best medical care. Again, thank you for your time and we look forward to your next visit. If we need to contact you regarding any test results, we will make 2 attempts to reach you at the number you have listed during your office visit today. If we are unable to reach you, a letter with your results and any further instructions will be mailed to your home.
ambulate

## 2020-12-02 NOTE — ASU DISCHARGE PLAN (ADULT/PEDIATRIC) - CALL YOUR DOCTOR IF YOU HAVE ANY OF THE FOLLOWING:
Bleeding that does not stop/Swelling that gets worse/Pain not relieved by Medications Swelling that gets worse/Fever greater than (need to indicate Fahrenheit or Celsius)/Bleeding that does not stop/Pain not relieved by Medications

## 2020-12-02 NOTE — ASU DISCHARGE PLAN (ADULT/PEDIATRIC) - CARE PROVIDER_API CALL
Lei Lucero  SURGERY  [address] [place] [address], [place] [id number]  Phone: [phone]  Fax: [phone]  Established Patient  Follow Up Time: 2 weeks

## 2020-12-29 ENCOUNTER — APPOINTMENT (OUTPATIENT)
Dept: SURGICAL ONCOLOGY | Facility: CLINIC | Age: 45
End: 2020-12-29
Payer: COMMERCIAL

## 2020-12-29 VITALS
BODY MASS INDEX: 28.34 KG/M2 | HEIGHT: 64 IN | DIASTOLIC BLOOD PRESSURE: 86 MMHG | WEIGHT: 166 LBS | OXYGEN SATURATION: 99 % | TEMPERATURE: 97.1 F | SYSTOLIC BLOOD PRESSURE: 137 MMHG | HEART RATE: 102 BPM

## 2020-12-29 PROCEDURE — 99024 POSTOP FOLLOW-UP VISIT: CPT

## 2020-12-29 NOTE — CONSULT LETTER
[Dear  ___] : Dear  [unfilled], [Courtesy Letter:] : I had the pleasure of seeing your patient, [unfilled], in my office today. [Please see my note below.] : Please see my note below. [Consult Closing:] : Thank you very much for allowing me to participate in the care of this patient.  If you have any questions, please do not hesitate to contact me. [Sincerely,] : Sincerely, [FreeTextEntry1] : i will keep you informed of my follow-up. [FreeTextEntry3] : Lei Lucero MD FACS\par Chief of Surgical Oncology\par \par

## 2020-12-29 NOTE — HISTORY OF PRESENT ILLNESS
[de-identified] : Ms. Tsai is a 45 year old female who presents today for a initial post-op visit. She is s/p left breast YANELY- localized lumpectomy on 20. Final pathology revealed proliferative fibrocystic changes including columnar cell hyperplasia, columnar cell change, stromal fibrosis, microcystformation, adenosis, fibroadenomatoid change and pseudoangiomatous stromal hyperplasia (PASH).\par \par She was seen in initial consultation in 2019 for a right breast mass.    She went for her annual breast imaging including mammo/sono 2018 and was noted with a 0.6cm irregular nodular density at the 2:00 position Right breast for which US guided biopsy was recommended (Birads 4).   She is s/p right breast 2:00, 3cmfn US guided core biopsy on 2020- Path: Benign breast tissue with dense fibrosis.   \par \par Bilateral mammogram/sonogram 2020- IMPRESSION:  1). Developing nodular asymmetry far upper posterior left breast, likely corresponding to one of the new deeply located complicated cyst left breast on ultrasound at 1-2:00 or 2:00. Further evaluation by ultrasound-guided cyst aspirations and postprocedure mammogram to ascertain correlation is recommended. If correlation is not established, then tomosynthesis guided stereotactic core biopsy is recommended for the mammographic asymmetry.  2) Probable benign loosely grouped calcifications left upper outer posterior breast, for which a six-month follow-up left diagnostic mammogram recommended to ascertain radiographic stability.  3.) Probable benign cluster of microcysts left breast at 2:00, for which a six-month follow-up ultrasound recommended to ascertain stability.  RECOMMENDATION:  Ultrasound biopsy.  BI-RADS 4A  - Suspicious Finding(s) -Low Suspicion for Malignancy\par \par  She is s/p left breast 2:00 FNA on 2020 -Path: Negative for malignant cells; compatible with apocrine metaplastic cyst contents. \par \par Diagnostic left mammogram/sonogram 2020- Interval increase in calcifications within the deep posterior left upper outer quadrant, biopsy is advised.  BIRADS 4A\par \par  Pt. is s/p left posterior upper outer breast stereotactic core biopsy on 11/10/2020.  Pathology revealed Atypical ductal hyperplasia (ADH) in background of columnar cell change and columnar cell hyperplasia associated with calcifications.  Fibrocystic changes including stromal fibrosis, microcyst formation and adenosis associated with microcalcifications.  \par \par PERTINENT HISTORY:\par  PMH otherwise significant for hypothyroidism.\par Denies family history of breast or ovarian cancer.  Father with history of gastric cancer.\par \par Menarche: 10 y.o.\par  \par Age at first pregnancy: 26 y.o.\par \par Denies pain, fever, chills. Incision site healing well. \par Internist: Dr. Clive Rudd

## 2020-12-29 NOTE — ASSESSMENT
[FreeTextEntry1] : Impression:\par Increasing Left breast calcifications on breast imaging\par S/p stereotactic core bx 11/5/2020- Path: ADH\par s/p left breast YANELY- localized lumpectomy on 12/2/20.\par  PATH: proliferative fibrocystic changes including columnar cell hyperplasia, columnar cell change, stromal fibrosis, microcystformation, adenosis, fibroadenomatoid change and pseudoangiomatous stromal hyperplasia (PASH).\par \par therefore negative margins around original ADH\par \par Plan:\par RTO 3 months\par bilateral mammogram and sonogram June, 2021\par

## 2021-03-30 ENCOUNTER — APPOINTMENT (OUTPATIENT)
Dept: SURGICAL ONCOLOGY | Facility: CLINIC | Age: 46
End: 2021-03-30
Payer: COMMERCIAL

## 2021-03-30 VITALS
TEMPERATURE: 97.9 F | DIASTOLIC BLOOD PRESSURE: 83 MMHG | OXYGEN SATURATION: 99 % | WEIGHT: 166 LBS | HEIGHT: 64 IN | SYSTOLIC BLOOD PRESSURE: 138 MMHG | HEART RATE: 60 BPM | BODY MASS INDEX: 28.34 KG/M2

## 2021-03-30 PROCEDURE — 99214 OFFICE O/P EST MOD 30 MIN: CPT

## 2021-03-30 PROCEDURE — 99072 ADDL SUPL MATRL&STAF TM PHE: CPT

## 2021-03-30 NOTE — ASSESSMENT
[FreeTextEntry1] : Impression:\par Increasing Left breast calcifications on breast imaging\par S/p stereotactic core bx 11/5/2020- Path: ADH\par s/p left breast YANELY- localized lumpectomy on 12/2/20.\par  PATH: proliferative fibrocystic changes including columnar cell hyperplasia, columnar cell change, stromal fibrosis, microcystformation, adenosis, fibroadenomatoid change and pseudoangiomatous stromal hyperplasia (PASH).\par \par therefore negative margins around original ADH\par \par Plan:\par RTO 6 months \par Bilateral mammogram and sonogram now\par

## 2021-03-30 NOTE — PHYSICAL EXAM
[Normal] : supple, no neck mass and thyroid not enlarged [Normal Neck Lymph Nodes] : normal neck lymph nodes  [Normal Supraclavicular Lymph Nodes] : normal supraclavicular lymph nodes [Normal Axillary Lymph Nodes] : normal axillary lymph nodes [Normal] : oriented to person, place and time, with appropriate affect [de-identified] : left breast scar from excision but no masses

## 2021-03-30 NOTE — HISTORY OF PRESENT ILLNESS
[de-identified] : Ms. Tsai is a 45 year old female who presents today for a follow up visit. She is s/p left breast YANELY- localized lumpectomy on 20. Final pathology revealed proliferative fibrocystic changes including columnar cell hyperplasia, columnar cell change, stromal fibrosis, microcystformation, adenosis, fibroadenomatoid change and pseudoangiomatous stromal hyperplasia (PASH). Pt. is s/p left posterior upper outer breast stereotactic core biopsy on 11/10/2020.  Pathology revealed Atypical ductal hyperplasia (ADH) in background of columnar cell change and columnar cell hyperplasia associated with calcifications.  Fibrocystic changes including stromal fibrosis, microcyst formation and adenosis associated with microcalcifications.   \par \par PERTINENT HISTORY:\par She was seen in initial consultation in 2019 for a right breast mass.    She went for her annual breast imaging including mammo/sono 2018 and was noted with a 0.6cm irregular nodular density at the 2:00 position Right breast for which US guided biopsy was recommended (Birads 4).  She is s/p right breast 2:00, 3cmfn US guided core biopsy on 2020- Path: Benign breast tissue with dense fibrosis.   \par \par Bilateral mammogram/sonogram 2020- IMPRESSION:  1). Developing nodular asymmetry far upper posterior left breast, likely corresponding to one of the new deeply located complicated cyst left breast on ultrasound at 1-2:00 or 2:00. Further evaluation by ultrasound-guided cyst aspirations and postprocedure mammogram to ascertain correlation is recommended. If correlation is not established, then tomosynthesis guided stereotactic core biopsy is recommended for the mammographic asymmetry.  2) Probable benign loosely grouped calcifications left upper outer posterior breast, for which a six-month follow-up left diagnostic mammogram recommended to ascertain radiographic stability.  3.) Probable benign cluster of microcysts left breast at 2:00, for which a six-month follow-up ultrasound recommended to ascertain stability.  RECOMMENDATION:  Ultrasound biopsy.  BI-RADS 4A  - Suspicious Finding(s) -Low Suspicion for Malignancy\par \par She is s/p left breast 2:00 FNA on 2020 -Path: Negative for malignant cells; compatible with apocrine metaplastic cyst contents. \par \par Diagnostic left mammogram/sonogram 2020- Interval increase in calcifications within the deep posterior left upper outer quadrant, biopsy is advised.  BIRADS 4A\par \par PMH otherwise significant for hypothyroidism.\par Denies family history of breast or ovarian cancer.  Father with history of gastric cancer.\par \par Menarche: 10 y.o.\par  \par Age at first pregnancy: 26 y.o.\par \par Today, she denies palpable breast masses, nipple discharge, skin changes, inversion or breast pain. Denies constitutional symptoms.\par

## 2021-05-03 ENCOUNTER — APPOINTMENT (OUTPATIENT)
Dept: ULTRASOUND IMAGING | Facility: IMAGING CENTER | Age: 46
End: 2021-05-03
Payer: COMMERCIAL

## 2021-05-03 ENCOUNTER — RESULT REVIEW (OUTPATIENT)
Age: 46
End: 2021-05-03

## 2021-05-03 ENCOUNTER — APPOINTMENT (OUTPATIENT)
Dept: MAMMOGRAPHY | Facility: IMAGING CENTER | Age: 46
End: 2021-05-03
Payer: COMMERCIAL

## 2021-05-03 ENCOUNTER — OUTPATIENT (OUTPATIENT)
Dept: OUTPATIENT SERVICES | Facility: HOSPITAL | Age: 46
LOS: 1 days | End: 2021-05-03
Payer: COMMERCIAL

## 2021-05-03 DIAGNOSIS — Z87.59 PERSONAL HISTORY OF OTHER COMPLICATIONS OF PREGNANCY, CHILDBIRTH AND THE PUERPERIUM: Chronic | ICD-10-CM

## 2021-05-03 DIAGNOSIS — Z98.890 OTHER SPECIFIED POSTPROCEDURAL STATES: Chronic | ICD-10-CM

## 2021-05-03 DIAGNOSIS — E66.9 OBESITY, UNSPECIFIED: Chronic | ICD-10-CM

## 2021-05-03 DIAGNOSIS — N60.92 UNSPECIFIED BENIGN MAMMARY DYSPLASIA OF LEFT BREAST: ICD-10-CM

## 2021-05-03 DIAGNOSIS — Z98.891 HISTORY OF UTERINE SCAR FROM PREVIOUS SURGERY: Chronic | ICD-10-CM

## 2021-05-03 PROCEDURE — 76641 ULTRASOUND BREAST COMPLETE: CPT | Mod: 26,50

## 2021-05-03 PROCEDURE — G0279: CPT | Mod: 26

## 2021-05-03 PROCEDURE — G0279: CPT

## 2021-05-03 PROCEDURE — 76641 ULTRASOUND BREAST COMPLETE: CPT

## 2021-05-03 PROCEDURE — 77066 DX MAMMO INCL CAD BI: CPT | Mod: 26

## 2021-05-03 PROCEDURE — 77066 DX MAMMO INCL CAD BI: CPT

## 2021-09-28 ENCOUNTER — RESULT REVIEW (OUTPATIENT)
Age: 46
End: 2021-09-28

## 2021-09-28 ENCOUNTER — APPOINTMENT (OUTPATIENT)
Dept: SURGICAL ONCOLOGY | Facility: CLINIC | Age: 46
End: 2021-09-28
Payer: COMMERCIAL

## 2021-09-28 VITALS
SYSTOLIC BLOOD PRESSURE: 124 MMHG | OXYGEN SATURATION: 99 % | DIASTOLIC BLOOD PRESSURE: 81 MMHG | BODY MASS INDEX: 29.37 KG/M2 | HEIGHT: 64 IN | HEART RATE: 59 BPM | WEIGHT: 172 LBS

## 2021-09-28 VITALS — TEMPERATURE: 96.8 F

## 2021-09-28 PROCEDURE — 99214 OFFICE O/P EST MOD 30 MIN: CPT

## 2021-09-28 NOTE — PHYSICAL EXAM
[Normal] : supple, no neck mass and thyroid not enlarged [Normal Neck Lymph Nodes] : normal neck lymph nodes  [Normal Supraclavicular Lymph Nodes] : normal supraclavicular lymph nodes [Normal Axillary Lymph Nodes] : normal axillary lymph nodes [Normal] : oriented to person, place and time, with appropriate affect [de-identified] : fibrocystic changes but no masses

## 2021-09-28 NOTE — HISTORY OF PRESENT ILLNESS
[de-identified] : Ms. Tsai is a 46 year old female who presents today for a follow up visit. \par \par She is s/p left breast YANELY- localized lumpectomy on 20. Final pathology revealed proliferative fibrocystic changes including columnar cell hyperplasia, columnar cell change, stromal fibrosis, microcystformation, adenosis, fibroadenomatoid change and pseudoangiomatous stromal hyperplasia (PASH). Pt. is s/p left posterior upper outer breast stereotactic core biopsy on 11/10/2020.  Pathology revealed Atypical ductal hyperplasia (ADH) in background of columnar cell change and columnar cell hyperplasia associated with calcifications.  Fibrocystic changes including stromal fibrosis, microcyst formation and adenosis associated with microcalcifications.   \par \par PERTINENT HISTORY:\par She was seen in initial consultation in 2019 for a right breast mass.    She went for her annual breast imaging including mammo/sono 2018 and was noted with a 0.6cm irregular nodular density at the 2:00 position Right breast for which US guided biopsy was recommended (Birads 4).  She is s/p right breast 2:00, 3cmfn US guided core biopsy on 2020- Path: Benign breast tissue with dense fibrosis.   \par \par Bilateral mammogram/sonogram 2020- IMPRESSION:  1). Developing nodular asymmetry far upper posterior left breast, likely corresponding to one of the new deeply located complicated cyst left breast on ultrasound at 1-2:00 or 2:00. Further evaluation by ultrasound-guided cyst aspirations and postprocedure mammogram to ascertain correlation is recommended. If correlation is not established, then tomosynthesis guided stereotactic core biopsy is recommended for the mammographic asymmetry.  2) Probable benign loosely grouped calcifications left upper outer posterior breast, for which a six-month follow-up left diagnostic mammogram recommended to ascertain radiographic stability.  3.) Probable benign cluster of microcysts left breast at 2:00, for which a six-month follow-up ultrasound recommended to ascertain stability.  RECOMMENDATION:  Ultrasound biopsy.  BI-RADS 4A  - Suspicious Finding(s) -Low Suspicion for Malignancy\par \par She is s/p left breast 2:00 FNA on 2020 -Path: Negative for malignant cells; compatible with apocrine metaplastic cyst contents. \par \par Diagnostic left mammogram/sonogram 2020- Interval increase in calcifications within the deep posterior left upper outer quadrant, biopsy is advised.  BIRADS 4A\par \par PMH otherwise significant for hypothyroidism.\par Denies family history of breast or ovarian cancer.  Father with history of gastric cancer.\par \par Menarche: 10 y.o.\par  \par Age at first pregnancy: 26 y.o.\par \par Bilateral/Mammo 5/3/21: The breast parenchyma demonstrates a heterogeneous background echotexture (mixed fatty and fibroglandular). 1:00, 8 cm from the nipple, postsurgical scarring. Few scattered cysts, measuring up to 3 mm at 7:00, 1 cm from the nipple. No suspicious solid mass, BI-RADS 3.\par \par Today, she denies palpable breast masses, nipple discharge, skin changes, inversion or breast pain. Denies constitutional symptoms.\par

## 2021-09-28 NOTE — ASSESSMENT
[FreeTextEntry1] : Impression:\par Increasing Left breast calcifications on breast imaging\par S/p stereotactic core bx 11/5/2020- Path: ADH\par s/p left breast YANELY- localized lumpectomy on 12/2/20.\par  PATH: proliferative fibrocystic changes including columnar cell hyperplasia, columnar cell change, stromal fibrosis, microcystformation, adenosis, fibroadenomatoid change and pseudoangiomatous stromal hyperplasia (PASH).\par therefore negative margins around original ADH\par Bilateral Mammo/Sono 5/3/21: BI-RADS 3.\par \par Plan:\par RTO 6 months \par Right Breast Ultrasound November 2021\par

## 2021-10-30 ENCOUNTER — EMERGENCY (EMERGENCY)
Facility: HOSPITAL | Age: 46
LOS: 1 days | Discharge: HOME | End: 2021-10-30
Attending: EMERGENCY MEDICINE | Admitting: EMERGENCY MEDICINE
Payer: COMMERCIAL

## 2021-10-30 VITALS
TEMPERATURE: 98 F | DIASTOLIC BLOOD PRESSURE: 85 MMHG | OXYGEN SATURATION: 100 % | SYSTOLIC BLOOD PRESSURE: 131 MMHG | RESPIRATION RATE: 16 BRPM | HEART RATE: 88 BPM | HEIGHT: 64 IN

## 2021-10-30 DIAGNOSIS — E66.9 OBESITY, UNSPECIFIED: Chronic | ICD-10-CM

## 2021-10-30 DIAGNOSIS — Z98.890 OTHER SPECIFIED POSTPROCEDURAL STATES: Chronic | ICD-10-CM

## 2021-10-30 DIAGNOSIS — Z98.891 HISTORY OF UTERINE SCAR FROM PREVIOUS SURGERY: Chronic | ICD-10-CM

## 2021-10-30 DIAGNOSIS — Z87.59 PERSONAL HISTORY OF OTHER COMPLICATIONS OF PREGNANCY, CHILDBIRTH AND THE PUERPERIUM: Chronic | ICD-10-CM

## 2021-10-30 PROCEDURE — 71045 X-RAY EXAM CHEST 1 VIEW: CPT | Mod: 26

## 2021-10-30 PROCEDURE — 99284 EMERGENCY DEPT VISIT MOD MDM: CPT

## 2021-10-30 RX ORDER — ACETAMINOPHEN 500 MG
650 TABLET ORAL ONCE
Refills: 0 | Status: COMPLETED | OUTPATIENT
Start: 2021-10-30 | End: 2021-10-30

## 2021-10-30 RX ORDER — ALBUTEROL 90 UG/1
2 AEROSOL, METERED ORAL EVERY 6 HOURS
Refills: 0 | Status: DISCONTINUED | OUTPATIENT
Start: 2021-10-30 | End: 2021-11-03

## 2021-10-30 RX ADMIN — ALBUTEROL 2 PUFF(S): 90 AEROSOL, METERED ORAL at 15:00

## 2021-10-30 RX ADMIN — Medication 650 MILLIGRAM(S): at 15:00

## 2021-10-30 NOTE — ED PROVIDER NOTE - PATIENT PORTAL LINK FT
You can access the FollowMyHealth Patient Portal offered by [facility] by registering at the following website: [email or web address]. By joining [employer]’s FollowMyHealth portal, you will also be able to view your health information using other applications (apps) compatible with our system.

## 2021-10-30 NOTE — ED PROVIDER NOTE - CLINICAL SUMMARY MEDICAL DECISION MAKING FREE TEXT BOX
This is a 46 yr old F, pmh hypothyroid, breast mass with c/o fatigue, body ache, chest congestion. Pt stats she was tested covid + on 10/26. Her symptoms started on Sunday. Her PMD put her on prednisone,(  2 more dose left )and ivermectin ( finished the course of med) . Pt is not vaccinated. Pt is here to get the MAB infusion. Denies sob, chills, fever, n/v.  med, xray, reassess.

## 2021-10-30 NOTE — ED ADULT TRIAGE NOTE - CHIEF COMPLAINT QUOTE
c/o fatigue on Monday. Since then c/o cough/congestion. C/o mid chest dicomfort. Was tested positive for COVID Tuesday 10/26. Requesting infusion therapy.

## 2021-10-30 NOTE — ED ADULT NURSE NOTE - OBJECTIVE STATEMENT
patient received in intake exam room 3. alert and oriented x4, ambulates. complaining of body aches, cough and chest congestion since Monday. Tested positive for covid on Tuesday. Not vaccinated. Was on prednisone and ivermectin but states she still does not feel better. requesting monoclonal antibodies in ED. 02 sat 100% on room air. Well appearing. will monitor.

## 2021-10-30 NOTE — ED PROVIDER NOTE - PROGRESS NOTE DETAILS
NOAH Payne- discussed xray results with pt, at this time no pne, no effusion, she does understand she does not need emergent MAB infusion, will follow up out  patient. Oxy sat on ra 98% no changes with physical activities or on exacerbation. NOAH Payne- discussed xray results with pt, at this time no pne, no effusion, she does understand she does not meet the criteria for  emergent MAB infusion, will follow up out  patient. Oxy sat on ra 98% no changes with physical activities or on exacerbation.

## 2021-10-30 NOTE — ED PROVIDER NOTE - NSFOLLOWUPINSTRUCTIONS_ED_ALL_ED_FT
[email or web address]    You may have Coronavirus      Please follow instruction on provided COVID-19 discharge educational forms and self quarantine for 14 days.     Return to the ED immediately if you have *shortness of breath*, fever, pain, weakness, vomiting any concerns.    1. STAY HOME for 14 DAYS,  Minimize Human contact to ONLY ESSENTIAL  2. Every time you wash your hands, sing the HAPPY BIRTHDAY Song so you know you're washing long enough.  Make sure to scrub the webspace between your fingers.  3. DRINK 1-2 Liters of Pedialyte or Sugarfree Gatorade or water per day x at least 7 days.  The more you drink, the more energy you will have.  Your fatigue, lightheadedness, and body aches will decrease and your fever has a better chance of breaking if you are well hydrated.    4. For your Fever and Body aches takes TYLENOL 650 mg every 6 hours OR TYLENOL 1000mg every 8 hours.  If no relief with Tylenol, may take Ibuprofen 600mg every 6 hours in combination with Tylenol.  5. Buy a Pulse Oximeter to check your Oxygen  6. Stay away from anyone that is elderly, even if you live with them.  7. Even if you feel better, you must stay isolated for at least 3 days after your symptoms resolve without taking Tylenol.    RETURN TO THE ER IMMEDIATELY IF YOU HAVE WORSENING SHORTNESS OF BREATH OR Oxygen < 93%

## 2021-10-30 NOTE — ED ADULT NURSE NOTE - NSFALLRSKASSESSTYPE_ED_ALL_ED
MD Dr. Syeda Poe,     On 2/12/2021 the following referrals for psychiatry were provided to the patient:     Fernando Garcia, Psychiatrist,  [address], [place] [facility] [address], [address] [facility] Initial (On Arrival)

## 2021-10-30 NOTE — ED PROVIDER NOTE - NSICDXPASTSURGICALHX_GEN_ALL_CORE_FT
PAST SURGICAL HISTORY:  H/O 2 abortions ,     H/O  section 2006    H/O right breast biopsy 2019    Obese     S/P fine needle aspiration left breast-2020

## 2021-10-30 NOTE — ED PROVIDER NOTE - ATTENDING CONTRIBUTION TO CARE
46F  dx with covid 10/26.  having sore throat body ache, general weakness.  No previous vaccination.  H/o hypothyroidism.  Pt on RA 97, amb sat 97- 98.  Pt coughing, c/o chest discomfort due to cough.  Pt was on course of ivermectin and prednisone.  Pt does not meet criteria for monoclonal AB.  Rx albuterol, check CXR.  Refer to outpt COVID clinic for MAB consideration.    VS:  unremarkable    GEN - NAD;  malaise;   A+O x3   HEAD - NC/AT     ENT - PEERL, EOMI, mucous membranes   dry, no discharge      NECK: Neck supple, non-tender without lymphadenopathy, no masses, no JVD  PULM - CTA b/l,  symmetric breath sounds  COR -  normal heart sounds    ABD - , ND, NT, soft,  BACK - no CVA tenderness, nontender spine     EXTREMS - no edema, no deformity, warm and well perfused    SKIN - no rash    or bruising      NEUROLOGIC - alert, face symmetric, speech fluent, sensation nl, motor no focal deficit.

## 2021-10-30 NOTE — ED PROVIDER NOTE - OBJECTIVE STATEMENT
This is a 46 yr old F, pmh hypothyroid, breast mass with c/o fatigue, body ache, chest congestion. Pt stats she was tested covid + on 10/26. Her symptoms started on Sunday. Her PMD put her on prednisone,(  2 more dose left )and ivermectin ( finished the course of med) . Pt is not vaccinated. Pt is here to get the MAB infusion. Denies sob, chills, fever, n/v.

## 2022-01-04 ENCOUNTER — INPATIENT (INPATIENT)
Facility: HOSPITAL | Age: 47
LOS: 3 days | Discharge: HOME | DRG: 419 | End: 2022-01-08
Attending: SURGERY | Admitting: SURGERY
Payer: COMMERCIAL

## 2022-01-04 VITALS
HEART RATE: 71 BPM | TEMPERATURE: 98 F | RESPIRATION RATE: 16 BRPM | WEIGHT: 169.09 LBS | DIASTOLIC BLOOD PRESSURE: 65 MMHG | SYSTOLIC BLOOD PRESSURE: 114 MMHG | OXYGEN SATURATION: 97 % | HEIGHT: 64 IN

## 2022-01-04 DIAGNOSIS — E66.9 OBESITY, UNSPECIFIED: Chronic | ICD-10-CM

## 2022-01-04 DIAGNOSIS — K85.10 BILIARY ACUTE PANCREATITIS WITHOUT NECROSIS OR INFECTION: ICD-10-CM

## 2022-01-04 DIAGNOSIS — Z98.890 OTHER SPECIFIED POSTPROCEDURAL STATES: Chronic | ICD-10-CM

## 2022-01-04 DIAGNOSIS — Z87.59 PERSONAL HISTORY OF OTHER COMPLICATIONS OF PREGNANCY, CHILDBIRTH AND THE PUERPERIUM: Chronic | ICD-10-CM

## 2022-01-04 DIAGNOSIS — Z98.891 HISTORY OF UTERINE SCAR FROM PREVIOUS SURGERY: Chronic | ICD-10-CM

## 2022-01-04 LAB
ALBUMIN SERPL ELPH-MCNC: 3.6 G/DL — SIGNIFICANT CHANGE UP (ref 3.5–5)
ALP SERPL-CCNC: 150 U/L — HIGH (ref 40–120)
ALT FLD-CCNC: 879 U/L DA — HIGH (ref 10–60)
ANION GAP SERPL CALC-SCNC: 6 MMOL/L — SIGNIFICANT CHANGE UP (ref 5–17)
APPEARANCE UR: CLEAR — SIGNIFICANT CHANGE UP
AST SERPL-CCNC: 328 U/L — HIGH (ref 10–40)
BACTERIA # UR AUTO: ABNORMAL /HPF
BASOPHILS # BLD AUTO: 0.05 K/UL — SIGNIFICANT CHANGE UP (ref 0–0.2)
BASOPHILS NFR BLD AUTO: 0.5 % — SIGNIFICANT CHANGE UP (ref 0–2)
BILIRUB SERPL-MCNC: 1 MG/DL — SIGNIFICANT CHANGE UP (ref 0.2–1.2)
BILIRUB UR-MCNC: NEGATIVE — SIGNIFICANT CHANGE UP
BUN SERPL-MCNC: 11 MG/DL — SIGNIFICANT CHANGE UP (ref 7–18)
CALCIUM SERPL-MCNC: 9.5 MG/DL — SIGNIFICANT CHANGE UP (ref 8.4–10.5)
CHLORIDE SERPL-SCNC: 102 MMOL/L — SIGNIFICANT CHANGE UP (ref 96–108)
CO2 SERPL-SCNC: 31 MMOL/L — SIGNIFICANT CHANGE UP (ref 22–31)
COLOR SPEC: YELLOW — SIGNIFICANT CHANGE UP
CREAT SERPL-MCNC: 0.81 MG/DL — SIGNIFICANT CHANGE UP (ref 0.5–1.3)
DIFF PNL FLD: ABNORMAL
EOSINOPHIL # BLD AUTO: 0.07 K/UL — SIGNIFICANT CHANGE UP (ref 0–0.5)
EOSINOPHIL NFR BLD AUTO: 0.7 % — SIGNIFICANT CHANGE UP (ref 0–6)
EPI CELLS # UR: ABNORMAL /HPF
GLUCOSE SERPL-MCNC: 98 MG/DL — SIGNIFICANT CHANGE UP (ref 70–99)
GLUCOSE UR QL: NEGATIVE — SIGNIFICANT CHANGE UP
HCG SERPL-ACNC: <1 MIU/ML — SIGNIFICANT CHANGE UP
HCT VFR BLD CALC: 47 % — HIGH (ref 34.5–45)
HGB BLD-MCNC: 15.5 G/DL — SIGNIFICANT CHANGE UP (ref 11.5–15.5)
IMM GRANULOCYTES NFR BLD AUTO: 0.4 % — SIGNIFICANT CHANGE UP (ref 0–1.5)
KETONES UR-MCNC: NEGATIVE — SIGNIFICANT CHANGE UP
LEUKOCYTE ESTERASE UR-ACNC: ABNORMAL
LIDOCAIN IGE QN: HIGH U/L (ref 73–393)
LYMPHOCYTES # BLD AUTO: 1.63 K/UL — SIGNIFICANT CHANGE UP (ref 1–3.3)
LYMPHOCYTES # BLD AUTO: 15.9 % — SIGNIFICANT CHANGE UP (ref 13–44)
MCHC RBC-ENTMCNC: 28.9 PG — SIGNIFICANT CHANGE UP (ref 27–34)
MCHC RBC-ENTMCNC: 33 GM/DL — SIGNIFICANT CHANGE UP (ref 32–36)
MCV RBC AUTO: 87.5 FL — SIGNIFICANT CHANGE UP (ref 80–100)
MONOCYTES # BLD AUTO: 0.77 K/UL — SIGNIFICANT CHANGE UP (ref 0–0.9)
MONOCYTES NFR BLD AUTO: 7.5 % — SIGNIFICANT CHANGE UP (ref 2–14)
NEUTROPHILS # BLD AUTO: 7.67 K/UL — HIGH (ref 1.8–7.4)
NEUTROPHILS NFR BLD AUTO: 75 % — SIGNIFICANT CHANGE UP (ref 43–77)
NITRITE UR-MCNC: NEGATIVE — SIGNIFICANT CHANGE UP
NRBC # BLD: 0 /100 WBCS — SIGNIFICANT CHANGE UP (ref 0–0)
PH UR: 8 — SIGNIFICANT CHANGE UP (ref 5–8)
PLATELET # BLD AUTO: 365 K/UL — SIGNIFICANT CHANGE UP (ref 150–400)
POTASSIUM SERPL-MCNC: 3.9 MMOL/L — SIGNIFICANT CHANGE UP (ref 3.5–5.3)
POTASSIUM SERPL-SCNC: 3.9 MMOL/L — SIGNIFICANT CHANGE UP (ref 3.5–5.3)
PROT SERPL-MCNC: 8 G/DL — SIGNIFICANT CHANGE UP (ref 6–8.3)
PROT UR-MCNC: 30 MG/DL
RBC # BLD: 5.37 M/UL — HIGH (ref 3.8–5.2)
RBC # FLD: 12.6 % — SIGNIFICANT CHANGE UP (ref 10.3–14.5)
RBC CASTS # UR COMP ASSIST: ABNORMAL /HPF (ref 0–2)
SARS-COV-2 RNA SPEC QL NAA+PROBE: SIGNIFICANT CHANGE UP
SODIUM SERPL-SCNC: 139 MMOL/L — SIGNIFICANT CHANGE UP (ref 135–145)
SP GR SPEC: 1.01 — SIGNIFICANT CHANGE UP (ref 1.01–1.02)
UROBILINOGEN FLD QL: 1
WBC # BLD: 10.23 K/UL — SIGNIFICANT CHANGE UP (ref 3.8–10.5)
WBC # FLD AUTO: 10.23 K/UL — SIGNIFICANT CHANGE UP (ref 3.8–10.5)
WBC UR QL: SIGNIFICANT CHANGE UP /HPF (ref 0–5)

## 2022-01-04 PROCEDURE — 74177 CT ABD & PELVIS W/CONTRAST: CPT | Mod: 26,MG

## 2022-01-04 PROCEDURE — 99285 EMERGENCY DEPT VISIT HI MDM: CPT

## 2022-01-04 PROCEDURE — 99222 1ST HOSP IP/OBS MODERATE 55: CPT

## 2022-01-04 PROCEDURE — G1004: CPT

## 2022-01-04 PROCEDURE — 76705 ECHO EXAM OF ABDOMEN: CPT | Mod: 26,RT

## 2022-01-04 RX ORDER — KETOROLAC TROMETHAMINE 30 MG/ML
15 SYRINGE (ML) INJECTION ONCE
Refills: 0 | Status: DISCONTINUED | OUTPATIENT
Start: 2022-01-04 | End: 2022-01-04

## 2022-01-04 RX ORDER — HEPARIN SODIUM 5000 [USP'U]/ML
5000 INJECTION INTRAVENOUS; SUBCUTANEOUS EVERY 8 HOURS
Refills: 0 | Status: DISCONTINUED | OUTPATIENT
Start: 2022-01-04 | End: 2022-01-07

## 2022-01-04 RX ORDER — HYDROMORPHONE HYDROCHLORIDE 2 MG/ML
0.5 INJECTION INTRAMUSCULAR; INTRAVENOUS; SUBCUTANEOUS EVERY 4 HOURS
Refills: 0 | Status: DISCONTINUED | OUTPATIENT
Start: 2022-01-04 | End: 2022-01-07

## 2022-01-04 RX ORDER — SODIUM CHLORIDE 9 MG/ML
1000 INJECTION, SOLUTION INTRAVENOUS
Refills: 0 | Status: DISCONTINUED | OUTPATIENT
Start: 2022-01-04 | End: 2022-01-07

## 2022-01-04 RX ORDER — KETOROLAC TROMETHAMINE 30 MG/ML
15 SYRINGE (ML) INJECTION EVERY 6 HOURS
Refills: 0 | Status: DISCONTINUED | OUTPATIENT
Start: 2022-01-04 | End: 2022-01-07

## 2022-01-04 RX ORDER — METRONIDAZOLE 500 MG
500 TABLET ORAL EVERY 8 HOURS
Refills: 0 | Status: DISCONTINUED | OUTPATIENT
Start: 2022-01-04 | End: 2022-01-07

## 2022-01-04 RX ORDER — LEVOTHYROXINE SODIUM 125 MCG
150 TABLET ORAL DAILY
Refills: 0 | Status: DISCONTINUED | OUTPATIENT
Start: 2022-01-04 | End: 2022-01-07

## 2022-01-04 RX ORDER — FAMOTIDINE 10 MG/ML
20 INJECTION INTRAVENOUS ONCE
Refills: 0 | Status: COMPLETED | OUTPATIENT
Start: 2022-01-04 | End: 2022-01-04

## 2022-01-04 RX ORDER — METRONIDAZOLE 500 MG
500 TABLET ORAL ONCE
Refills: 0 | Status: COMPLETED | OUTPATIENT
Start: 2022-01-04 | End: 2022-01-04

## 2022-01-04 RX ORDER — SODIUM CHLORIDE 9 MG/ML
1000 INJECTION, SOLUTION INTRAVENOUS
Refills: 0 | Status: DISCONTINUED | OUTPATIENT
Start: 2022-01-04 | End: 2022-01-04

## 2022-01-04 RX ORDER — ONDANSETRON 8 MG/1
4 TABLET, FILM COATED ORAL EVERY 6 HOURS
Refills: 0 | Status: DISCONTINUED | OUTPATIENT
Start: 2022-01-04 | End: 2022-01-07

## 2022-01-04 RX ORDER — METRONIDAZOLE 500 MG
TABLET ORAL
Refills: 0 | Status: DISCONTINUED | OUTPATIENT
Start: 2022-01-04 | End: 2022-01-07

## 2022-01-04 RX ORDER — SODIUM CHLORIDE 9 MG/ML
1000 INJECTION INTRAMUSCULAR; INTRAVENOUS; SUBCUTANEOUS
Refills: 0 | Status: DISCONTINUED | OUTPATIENT
Start: 2022-01-04 | End: 2022-01-04

## 2022-01-04 RX ORDER — SODIUM CHLORIDE 9 MG/ML
1000 INJECTION INTRAMUSCULAR; INTRAVENOUS; SUBCUTANEOUS ONCE
Refills: 0 | Status: COMPLETED | OUTPATIENT
Start: 2022-01-04 | End: 2022-01-04

## 2022-01-04 RX ORDER — ONDANSETRON 8 MG/1
4 TABLET, FILM COATED ORAL ONCE
Refills: 0 | Status: COMPLETED | OUTPATIENT
Start: 2022-01-04 | End: 2022-01-04

## 2022-01-04 RX ORDER — ACETAMINOPHEN 500 MG
650 TABLET ORAL EVERY 6 HOURS
Refills: 0 | Status: DISCONTINUED | OUTPATIENT
Start: 2022-01-04 | End: 2022-01-07

## 2022-01-04 RX ORDER — CIPROFLOXACIN LACTATE 400MG/40ML
400 VIAL (ML) INTRAVENOUS EVERY 12 HOURS
Refills: 0 | Status: DISCONTINUED | OUTPATIENT
Start: 2022-01-04 | End: 2022-01-07

## 2022-01-04 RX ADMIN — Medication 15 MILLIGRAM(S): at 17:15

## 2022-01-04 RX ADMIN — HYDROMORPHONE HYDROCHLORIDE 0.5 MILLIGRAM(S): 2 INJECTION INTRAMUSCULAR; INTRAVENOUS; SUBCUTANEOUS at 21:53

## 2022-01-04 RX ADMIN — ONDANSETRON 4 MILLIGRAM(S): 8 TABLET, FILM COATED ORAL at 10:54

## 2022-01-04 RX ADMIN — Medication 200 MILLIGRAM(S): at 18:46

## 2022-01-04 RX ADMIN — HEPARIN SODIUM 5000 UNIT(S): 5000 INJECTION INTRAVENOUS; SUBCUTANEOUS at 21:52

## 2022-01-04 RX ADMIN — FAMOTIDINE 20 MILLIGRAM(S): 10 INJECTION INTRAVENOUS at 10:54

## 2022-01-04 RX ADMIN — Medication 15 MILLIGRAM(S): at 11:39

## 2022-01-04 RX ADMIN — SODIUM CHLORIDE 2000 MILLILITER(S): 9 INJECTION INTRAMUSCULAR; INTRAVENOUS; SUBCUTANEOUS at 10:54

## 2022-01-04 RX ADMIN — HEPARIN SODIUM 5000 UNIT(S): 5000 INJECTION INTRAVENOUS; SUBCUTANEOUS at 18:45

## 2022-01-04 RX ADMIN — Medication 100 MILLIGRAM(S): at 17:59

## 2022-01-04 RX ADMIN — SODIUM CHLORIDE 150 MILLILITER(S): 9 INJECTION, SOLUTION INTRAVENOUS at 21:52

## 2022-01-04 RX ADMIN — Medication 15 MILLIGRAM(S): at 17:59

## 2022-01-04 NOTE — ED PROVIDER NOTE - PHYSICAL EXAMINATION
GEN:   comfortable, in no apparent distress, AOx3  EYES:   PERRL, extra-occular movements intact  RESP:   clear to auscultation bilaterally, non-labored, speaking in full sentences  ABD:   soft, no guarding, no abd ttp on exam.  :   no cva tenderness  MSK:   no musculoskeletal tenderness, 5/5 strength, moving all extremities  SKIN:   dry, intact, no rash  NEURO:   AOx3, no focal weakness or loss of sensation, gait normal, GCS 15  PSYCH: calm, cooperative, no apparent risk to self and others

## 2022-01-04 NOTE — H&P ADULT - HISTORY OF PRESENT ILLNESS
H+P pending 46 year old female, seen in the ED for US and CT findings of gallstone pancreatitis. Patient states that she came to the ED after having severe abdominal pain on sunday night, with associated vomiting. She states that her pain improved and she ate bread oleksandr 46 year old female, with PMH hypothyroid, seen in the ED for US and CT findings of gallstone pancreatitis. Patient states that she came to the ED after having severe abdominal pain on sunday night, with associated vomiting. She went to [facility] on jeremiah night but stated that she was uncomfortable due to the high prevalence of COVID in the ED and she refused any radiographic exams at the time and left the hospital.  She states that her pain improved and she ate bread with cheese today and went to work but then she started experiencing 7/10 pain, which brought her to the ED today. She had some nausea prior to admission. She states that the pain is epigastric and radiates to the RUQ and back. She states she had a bowel movement yesterday and has been passing gas. She denies any N/V, fever, dysuria, hematuria, or any other urinary complaints, chills, SOB, chest pain or any constitutional symptoms at this time.

## 2022-01-04 NOTE — H&P ADULT - NSHPLABSRESULTS_GEN_ALL_CORE
Vital Signs Last 24 Hrs  T(C): 36.7 (04 Jan 2022 16:42), Max: 36.8 (04 Jan 2022 10:05)  T(F): 98 (04 Jan 2022 16:42), Max: 98.2 (04 Jan 2022 10:05)  HR: 61 (04 Jan 2022 16:42) (61 - 71)  BP: 121/58 (04 Jan 2022 16:42) (114/65 - 121/58)  BP(mean): --  RR: 20 (04 Jan 2022 16:42) (16 - 20)  SpO2: 99% (04 Jan 2022 16:42) (97% - 99%)      I&O's Detail      LABS:                        15.5   10.23 )-----------( 365      ( 04 Jan 2022 11:02 )             47.0             01-04    139  |  102  |  11  ----------------------------<  98  3.9   |  31  |  0.81    Ca    9.5      04 Jan 2022 11:02    TPro  8.0  /  Alb  3.6  /  TBili  1.0  /  DBili  x   /  AST  328<H>  /  ALT  879<H>  /  AlkPhos  150<H>  01-04          < from: CT Abdomen and Pelvis w/ IV Cont (01.04.22 @ 13:56) >    LIVER: Within normal limits.  BILE DUCTS: Normal caliber.  GALLBLADDER: Cholelithiasis. Possible trace pericholecystic fluid.   Correlate with right upper quadrant ultrasound..  SPLEEN: Within normal limits.  PANCREAS: Diffusely enlarged  may represent pancreatitis. Correlate with   lipase levels. Underlying occult neoplasm not excluded. Recommend   short-term follow-up CT or MRI. No pancreatic necrosis abscess or   pseudocyst..  ADRENALS: Within normal limits.  KIDNEYS/URETERS: Within normal limits.    BLADDER: Not adequately distended.  REPRODUCTIVE ORGANS: Very bulky heterogeneous uterus may represent   myomatous changes. Neoplastic involvement not excluded. Correlate with   pelvic ultrasound    BOWEL: No bowel obstruction or acute inflammation. Appendix normal  PERITONEUM: No ascites.  VESSELS: Nonaneurysmal  RETROPERITONEUM/LYMPH NODES: No lymphadenopathy.  ABDOMINAL WALL: Within normal limits.  BONES: No acute or aggressive pathology.    IMPRESSION:  Cholelithiasis with possible trace pericholecystic fluid. Correlate with   right upper quadrant ultrasound    Diffusely enlarged pancreas. Correlate with lipase levels for possible   pancreatitis.    Very bulky heterogeneous uterus as described. Correlate pelvic ultrasound.    < end of copied text >    < from: US Abdomen Upper Quadrant Right (01.04.22 @ 15:49) >      FINDINGS:    Liver: Within normal limits.  Bile ducts: Normal caliber. Common bile duct measures 4 mm.  Gallbladder: Cholelithiasis. Mild diffuse gallbladder wall thickening   possible cholecystitis. No pericholecystic fluid. Correlate with HIDA   scan as warranted.  Pancreas: Diffusely enlarged as noted on CT of the same day. Correlate   with lipase levels for pancreatitis  Right kidney: 12.2 cm. No hydronephrosis.  Ascites: None.  IVC: Visualized portions are within normal limits.    IMPRESSION:    Cholelithiasis without gallbladder wall thickening possible   cholecystitis. Correlate with HIDA scan as warranted. No biliary   dilatation.  Diffusely enlarged pancreas as noted on CT of the same day. Correlate   with lipase levels for pancreatitis.    < end of copied text >

## 2022-01-04 NOTE — ED ADULT NURSE NOTE - NSFALLRSKASSESSTYPE_ED_ALL_ED
Instructions: This plan will send the code FBSD to the PM system.  DO NOT or CHANGE the price. Detail Level: Simple Price (Do Not Change): 0.00 Initial (On Arrival)

## 2022-01-04 NOTE — H&P ADULT - NSHPPHYSICALEXAM_GEN_ALL_CORE
General: Well nourished/Well developed, NAD  Neurology: A&Ox3,   Respiratory: unlabored  Abdominal: Soft, tender to palpation in the epigastric and LUQ and mild tenderness to LLQ, Nondistended  Extremities: no calf pain bilaterally

## 2022-01-04 NOTE — ED PROVIDER NOTE - OBJECTIVE STATEMENT
46 year old female history of hypothyroidism presents for nausea, NBNB vomiting and diffuse abd pain.  States symptoms started 2 days ago and has been having trouble eating and drinking.  States took gas-x without relief. No fevers, chills, urinary symptoms, diarrhea, cp, sob.

## 2022-01-04 NOTE — H&P ADULT - ASSESSMENT
afebrile, wbc wnl   Lipase 03328  Elevated LFT; Alk Phos 150, Ast 328, Alt 839- GI consult     - Admit to surgery, to Dr. Jaimes   - NPO, IVF  - IV abx  - DVT ppx, OOB and ambulate as tolerated  - Antipyretic, antiemetic as needed    - GI consult  - f/u urine culture   - Trend Lipase, trend LFT  - possible MRCP in morning   - Discussed and agreed with Dr. Jaimes  46 year old female with gallstone pancreatitis   afebrile, wbc wnl   Lipase 53853  + UA, asx; on IV abx, await urine culture results   Elevated LFT; Alk Phos 150, Ast 328, Alt 839- GI consult     - Admit to surgery, to Dr. Jaimes   - NPO, IVF  - IV abx  - DVT ppx, OOB and ambulate as tolerated  - Antipyretic, antiemetic as needed    - GI consult  - f/u urine culture   - Trend Lipase, trend LFT  - possible MRCP in morning   - Discussed and agreed with Dr. Jaimes

## 2022-01-04 NOTE — ED PROVIDER NOTE - CLINICAL SUMMARY MEDICAL DECISION MAKING FREE TEXT BOX
46 year old female history of hypothyroidism presents for nausea, NBNB vomiting and diffuse abd pain.  No focal ABD pain on exam.  Will check labs, provide IVF and meds for symptoms.  Will hold off on imaging at this time based on neg abd exam.

## 2022-01-04 NOTE — ED PROVIDER NOTE - ATTENDING CONTRIBUTION TO CARE
Agree with NP H&P.  46 year old female with PMHx of hypothyroidism presents for non-bloody and non bilious vomiting for the past 2 days associated with some abdominal discomfort.  pt says took some over the counter meds with no relief.  Abdomen soft on exam.  Will check labs, supportive care and reassess.

## 2022-01-05 LAB
ALBUMIN SERPL ELPH-MCNC: 2.8 G/DL — LOW (ref 3.5–5)
ALP SERPL-CCNC: 99 U/L — SIGNIFICANT CHANGE UP (ref 40–120)
ALT FLD-CCNC: 451 U/L DA — HIGH (ref 10–60)
ANION GAP SERPL CALC-SCNC: 9 MMOL/L — SIGNIFICANT CHANGE UP (ref 5–17)
AST SERPL-CCNC: 91 U/L — HIGH (ref 10–40)
BASOPHILS # BLD AUTO: 0.05 K/UL — SIGNIFICANT CHANGE UP (ref 0–0.2)
BASOPHILS NFR BLD AUTO: 0.7 % — SIGNIFICANT CHANGE UP (ref 0–2)
BILIRUB DIRECT SERPL-MCNC: 0.1 MG/DL — SIGNIFICANT CHANGE UP (ref 0–0.3)
BILIRUB INDIRECT FLD-MCNC: 0.5 MG/DL — SIGNIFICANT CHANGE UP (ref 0.2–1)
BILIRUB SERPL-MCNC: 0.6 MG/DL — SIGNIFICANT CHANGE UP (ref 0.2–1.2)
BUN SERPL-MCNC: 10 MG/DL — SIGNIFICANT CHANGE UP (ref 7–18)
CALCIUM SERPL-MCNC: 8.4 MG/DL — SIGNIFICANT CHANGE UP (ref 8.4–10.5)
CHLORIDE SERPL-SCNC: 108 MMOL/L — SIGNIFICANT CHANGE UP (ref 96–108)
CO2 SERPL-SCNC: 22 MMOL/L — SIGNIFICANT CHANGE UP (ref 22–31)
CREAT SERPL-MCNC: 0.76 MG/DL — SIGNIFICANT CHANGE UP (ref 0.5–1.3)
CULTURE RESULTS: SIGNIFICANT CHANGE UP
EOSINOPHIL # BLD AUTO: 0.18 K/UL — SIGNIFICANT CHANGE UP (ref 0–0.5)
EOSINOPHIL NFR BLD AUTO: 2.5 % — SIGNIFICANT CHANGE UP (ref 0–6)
GLUCOSE SERPL-MCNC: 108 MG/DL — HIGH (ref 70–99)
HCG SERPL-ACNC: <1 MIU/ML — SIGNIFICANT CHANGE UP
HCT VFR BLD CALC: 39.6 % — SIGNIFICANT CHANGE UP (ref 34.5–45)
HGB BLD-MCNC: 13 G/DL — SIGNIFICANT CHANGE UP (ref 11.5–15.5)
IMM GRANULOCYTES NFR BLD AUTO: 0.3 % — SIGNIFICANT CHANGE UP (ref 0–1.5)
LIDOCAIN IGE QN: 4796 U/L — HIGH (ref 73–393)
LYMPHOCYTES # BLD AUTO: 1.96 K/UL — SIGNIFICANT CHANGE UP (ref 1–3.3)
LYMPHOCYTES # BLD AUTO: 27.2 % — SIGNIFICANT CHANGE UP (ref 13–44)
MCHC RBC-ENTMCNC: 29 PG — SIGNIFICANT CHANGE UP (ref 27–34)
MCHC RBC-ENTMCNC: 32.8 GM/DL — SIGNIFICANT CHANGE UP (ref 32–36)
MCV RBC AUTO: 88.2 FL — SIGNIFICANT CHANGE UP (ref 80–100)
MONOCYTES # BLD AUTO: 0.72 K/UL — SIGNIFICANT CHANGE UP (ref 0–0.9)
MONOCYTES NFR BLD AUTO: 10 % — SIGNIFICANT CHANGE UP (ref 2–14)
NEUTROPHILS # BLD AUTO: 4.27 K/UL — SIGNIFICANT CHANGE UP (ref 1.8–7.4)
NEUTROPHILS NFR BLD AUTO: 59.3 % — SIGNIFICANT CHANGE UP (ref 43–77)
NRBC # BLD: 0 /100 WBCS — SIGNIFICANT CHANGE UP (ref 0–0)
PLATELET # BLD AUTO: 278 K/UL — SIGNIFICANT CHANGE UP (ref 150–400)
POTASSIUM SERPL-MCNC: 3.5 MMOL/L — SIGNIFICANT CHANGE UP (ref 3.5–5.3)
POTASSIUM SERPL-SCNC: 3.5 MMOL/L — SIGNIFICANT CHANGE UP (ref 3.5–5.3)
PROT SERPL-MCNC: 6.1 G/DL — SIGNIFICANT CHANGE UP (ref 6–8.3)
RBC # BLD: 4.49 M/UL — SIGNIFICANT CHANGE UP (ref 3.8–5.2)
RBC # FLD: 12.5 % — SIGNIFICANT CHANGE UP (ref 10.3–14.5)
SODIUM SERPL-SCNC: 139 MMOL/L — SIGNIFICANT CHANGE UP (ref 135–145)
SPECIMEN SOURCE: SIGNIFICANT CHANGE UP
WBC # BLD: 7.2 K/UL — SIGNIFICANT CHANGE UP (ref 3.8–10.5)
WBC # FLD AUTO: 7.2 K/UL — SIGNIFICANT CHANGE UP (ref 3.8–10.5)

## 2022-01-05 PROCEDURE — 74181 MRI ABDOMEN W/O CONTRAST: CPT | Mod: 26

## 2022-01-05 PROCEDURE — 99222 1ST HOSP IP/OBS MODERATE 55: CPT

## 2022-01-05 PROCEDURE — 99232 SBSQ HOSP IP/OBS MODERATE 35: CPT

## 2022-01-05 RX ORDER — INFLUENZA VIRUS VACCINE 15; 15; 15; 15 UG/.5ML; UG/.5ML; UG/.5ML; UG/.5ML
0.5 SUSPENSION INTRAMUSCULAR ONCE
Refills: 0 | Status: DISCONTINUED | OUTPATIENT
Start: 2022-01-05 | End: 2022-01-08

## 2022-01-05 RX ADMIN — SODIUM CHLORIDE 150 MILLILITER(S): 9 INJECTION, SOLUTION INTRAVENOUS at 17:52

## 2022-01-05 RX ADMIN — Medication 150 MICROGRAM(S): at 06:27

## 2022-01-05 RX ADMIN — HEPARIN SODIUM 5000 UNIT(S): 5000 INJECTION INTRAVENOUS; SUBCUTANEOUS at 22:00

## 2022-01-05 RX ADMIN — Medication 200 MILLIGRAM(S): at 17:52

## 2022-01-05 RX ADMIN — ONDANSETRON 4 MILLIGRAM(S): 8 TABLET, FILM COATED ORAL at 00:11

## 2022-01-05 RX ADMIN — Medication 100 MILLIGRAM(S): at 14:15

## 2022-01-05 RX ADMIN — HEPARIN SODIUM 5000 UNIT(S): 5000 INJECTION INTRAVENOUS; SUBCUTANEOUS at 06:25

## 2022-01-05 RX ADMIN — Medication 15 MILLIGRAM(S): at 07:22

## 2022-01-05 RX ADMIN — ONDANSETRON 4 MILLIGRAM(S): 8 TABLET, FILM COATED ORAL at 06:52

## 2022-01-05 RX ADMIN — Medication 100 MILLIGRAM(S): at 02:50

## 2022-01-05 RX ADMIN — Medication 15 MILLIGRAM(S): at 06:52

## 2022-01-05 RX ADMIN — HEPARIN SODIUM 5000 UNIT(S): 5000 INJECTION INTRAVENOUS; SUBCUTANEOUS at 14:15

## 2022-01-05 RX ADMIN — Medication 200 MILLIGRAM(S): at 06:25

## 2022-01-05 RX ADMIN — Medication 100 MILLIGRAM(S): at 06:27

## 2022-01-05 RX ADMIN — Medication 100 MILLIGRAM(S): at 22:00

## 2022-01-05 RX ADMIN — ONDANSETRON 4 MILLIGRAM(S): 8 TABLET, FILM COATED ORAL at 17:56

## 2022-01-05 NOTE — PROGRESS NOTE ADULT - NSPROGADDITIONALINFOA_GEN_ALL_CORE
pt seen  Feeling better  abdomen less tender  MRCP - neg  Lipase trending down  Possible lap reese Friday after the a/c process resolves  Patient admitted with Cholelithiasis and gallstone pancreatitis. Radiology studies reviewed. Tender RUQ abdomen. Laparoscopic cholecystectomy possible open was discussed. The potential for bleeding, CBD injury, bowel injury and other related complications were discussed. All the questions were answered.

## 2022-01-05 NOTE — CONSULT NOTE ADULT - ASSESSMENT
46F presenting for epigastric pain, n/v, lipase of 18,000s, suspected gallstone pancreatitis. LFTs initially elevated with , ,  but now downtrending suspicious for passed stone/microlithiasis. MRCP neg for choledochoilthiasis or biliary dilation.   1. Elevated LFTs:   - Likely passed stone/microlithiasis, given downtrending and neg MRCP, no indication for EUS or ERCP at this time.   2. Pancreatitis: Likely gallstone induced as above. Now improving, pain better today, nausea resolved.   - Cholecystectomy per Surgery to prevent recurrence, consider this admission in light of pancreatitis.   - Can trial clear liquids.  - Continue LR IV until tolerating PO.

## 2022-01-05 NOTE — CONSULT NOTE ADULT - SUBJECTIVE AND OBJECTIVE BOX
CC: Abdominal pain, n/v.    HPI:  46F with pmhx of hypothyroidism presenting for abdominal pain. States started  night with associated episodes of nonbilious, non bloody vomiting. Went to [facility] but left as pain improved. States pain however recurred at home and worsened so came to our ER. Describes pain as epigastric, radiating to her sides and back, sharp, stabbing, was severe. Today, notes pain is much improved. Nausea also improved today. No further emesis. Denies any fever/chills, jaundice, diarrhea, melena, hematochezia, hematemesis, or other issues. Denies prior episodes of pain or gallstone issues in the past.    PAST MEDICAL HISTORY:   Above.    SURGICAL HISTORY:   .  Breast biopsy.      MEDICATIONS:  acetaminophen     Tablet .. 650 milliGRAM(s) Oral every 6 hours PRN  ciprofloxacin   IVPB 400 milliGRAM(s) IV Intermittent every 12 hours  heparin   Injectable 5000 Unit(s) SubCutaneous every 8 hours  HYDROmorphone  Injectable 0.5 milliGRAM(s) IV Push every 4 hours PRN  influenza   Vaccine 0.5 milliLiter(s) IntraMuscular once  ketorolac   Injectable 15 milliGRAM(s) IV Push every 6 hours PRN  lactated ringers. 1000 milliLiter(s) IV Continuous <Continuous>  levothyroxine 150 MICROGram(s) Oral daily  metroNIDAZOLE  IVPB 500 milliGRAM(s) IV Intermittent every 8 hours  metroNIDAZOLE  IVPB      ondansetron Injectable 4 milliGRAM(s) IV Push every 6 hours PRN    ALLERGIES:  No Known Allergies    SOCIAL HISTORY:   Denies tobacco use. Social etoh, no regular use. Denies drug use.     FAMILY HISTORY:  Stomach cancer in father. Denies fhx of pancreatic cancer.     REVIEW OF SYSTEMS:  CONSTITUTIONAL: No weakness, fevers or chills.  EYES/ENT: No visual changes;  No vertigo or throat pain.  NECK: No pain or stiffness.  RESPIRATORY: No cough, wheezing, hemoptysis; No shortness of breath.  CARDIOVASCULAR: No chest pain or palpitations.  GASTROINTESTINAL: As per HPI.   GENITOURINARY: No dysuria, frequency or hematuria.  NEUROLOGICAL: No numbness or weakness.  SKIN: No itching, rashes.      PHYSICAL EXAM:  VITAL SIGNS:  T(C): 36.7 (22 @ 13:53), Max: 36.7 (22 @ 21:50)  HR: 58 (22 @ 13:53) (54 - 68)  BP: 125/79 (22 @ 13:53) (102/65 - 125/79)  RR: 18 (22 @ 13:53) (17 - 18)  SpO2: 99% (22 @ 13:53) (95% - 99%)  I/Os:     GENERAL: NAD, lying in bed comfortably.  HEAD:  Atraumatic, Normocephalic.  EYES: EOMI, normal conjunctiva, no scleral icterus.  ENT: Moist mucous membranes.  NECK: Supple.  CHEST/LUNG: Clear to auscultation bilaterally; No rales, rhonchi, wheezing, or rubs. Unlabored respirations.  HEART: Regular rate and rhythm; No murmurs, rubs, or gallops.  ABDOMEN: BSx4; Soft, mildly tender epigastrium, nondistended.  EXTREMITIES:  No edema.  NERVOUS SYSTEM:  A&Ox3, no obvious focal deficits.  SKIN: No rashes or lesions.      LABS:                         13.0   7.20  )-----------( 278      ( 2022 06:25 )             39.6         139  |  108  |  10  ----------------------------<  108<H>  3.5   |  22  |  0.76    Ca    8.4      2022 06:25    TPro  6.1  /  Alb  2.8<L>  /  TBili  0.6  /  DBili  0.1  /  AST  91<H>  /  ALT  451<H>  /  AlkPhos  99        Urinalysis Basic - ( 2022 11:13 )    Color: Yellow / Appearance: Clear / S.010 / pH: x  Gluc: x / Ketone: Negative  / Bili: Negative / Urobili: 1   Blood: x / Protein: 30 mg/dL / Nitrite: Negative   Leuk Esterase: Small / RBC: 2-5 /HPF / WBC 3-5 /HPF   Sq Epi: x / Non Sq Epi: Occasional /HPF / Bacteria: Many /HPF        RADIOLOGY & ADDITIONAL TESTS: Reviewed.

## 2022-01-05 NOTE — PATIENT PROFILE ADULT - FALL HARM RISK - UNIVERSAL INTERVENTIONS
Bed in lowest position, wheels locked, appropriate side rails in place/Call bell, personal items and telephone in reach/Instruct patient to call for assistance before getting out of bed or chair/Non-slip footwear when patient is out of bed/[place] to call system/Physically safe environment - no spills, clutter or unnecessary equipment/Purposeful Proactive Rounding/Room/bathroom lighting operational, light cord in reach

## 2022-01-06 ENCOUNTER — TRANSCRIPTION ENCOUNTER (OUTPATIENT)
Age: 47
End: 2022-01-06

## 2022-01-06 LAB
ANION GAP SERPL CALC-SCNC: 7 MMOL/L — SIGNIFICANT CHANGE UP (ref 5–17)
BUN SERPL-MCNC: 10 MG/DL — SIGNIFICANT CHANGE UP (ref 7–18)
CALCIUM SERPL-MCNC: 8.8 MG/DL — SIGNIFICANT CHANGE UP (ref 8.4–10.5)
CHLORIDE SERPL-SCNC: 106 MMOL/L — SIGNIFICANT CHANGE UP (ref 96–108)
CO2 SERPL-SCNC: 23 MMOL/L — SIGNIFICANT CHANGE UP (ref 22–31)
CREAT SERPL-MCNC: 0.85 MG/DL — SIGNIFICANT CHANGE UP (ref 0.5–1.3)
GLUCOSE SERPL-MCNC: 151 MG/DL — HIGH (ref 70–99)
HCT VFR BLD CALC: 42.6 % — SIGNIFICANT CHANGE UP (ref 34.5–45)
HGB BLD-MCNC: 14.3 G/DL — SIGNIFICANT CHANGE UP (ref 11.5–15.5)
MCHC RBC-ENTMCNC: 29 PG — SIGNIFICANT CHANGE UP (ref 27–34)
MCHC RBC-ENTMCNC: 33.6 GM/DL — SIGNIFICANT CHANGE UP (ref 32–36)
MCV RBC AUTO: 86.4 FL — SIGNIFICANT CHANGE UP (ref 80–100)
NRBC # BLD: 0 /100 WBCS — SIGNIFICANT CHANGE UP (ref 0–0)
PLATELET # BLD AUTO: 372 K/UL — SIGNIFICANT CHANGE UP (ref 150–400)
POTASSIUM SERPL-MCNC: 4.1 MMOL/L — SIGNIFICANT CHANGE UP (ref 3.5–5.3)
POTASSIUM SERPL-SCNC: 4.1 MMOL/L — SIGNIFICANT CHANGE UP (ref 3.5–5.3)
RBC # BLD: 4.93 M/UL — SIGNIFICANT CHANGE UP (ref 3.8–5.2)
RBC # FLD: 11.9 % — SIGNIFICANT CHANGE UP (ref 10.3–14.5)
SARS-COV-2 RNA SPEC QL NAA+PROBE: SIGNIFICANT CHANGE UP
SODIUM SERPL-SCNC: 136 MMOL/L — SIGNIFICANT CHANGE UP (ref 135–145)
WBC # BLD: 8.31 K/UL — SIGNIFICANT CHANGE UP (ref 3.8–10.5)
WBC # FLD AUTO: 8.31 K/UL — SIGNIFICANT CHANGE UP (ref 3.8–10.5)

## 2022-01-06 PROCEDURE — 99232 SBSQ HOSP IP/OBS MODERATE 35: CPT | Mod: 57

## 2022-01-06 RX ADMIN — Medication 200 MILLIGRAM(S): at 18:13

## 2022-01-06 RX ADMIN — Medication 100 MILLIGRAM(S): at 21:14

## 2022-01-06 RX ADMIN — SODIUM CHLORIDE 150 MILLILITER(S): 9 INJECTION, SOLUTION INTRAVENOUS at 12:52

## 2022-01-06 RX ADMIN — Medication 15 MILLIGRAM(S): at 05:45

## 2022-01-06 RX ADMIN — Medication 100 MILLIGRAM(S): at 05:44

## 2022-01-06 RX ADMIN — HEPARIN SODIUM 5000 UNIT(S): 5000 INJECTION INTRAVENOUS; SUBCUTANEOUS at 13:27

## 2022-01-06 RX ADMIN — ONDANSETRON 4 MILLIGRAM(S): 8 TABLET, FILM COATED ORAL at 20:32

## 2022-01-06 RX ADMIN — Medication 100 MILLIGRAM(S): at 13:27

## 2022-01-06 RX ADMIN — Medication 15 MILLIGRAM(S): at 06:15

## 2022-01-06 RX ADMIN — Medication 200 MILLIGRAM(S): at 05:45

## 2022-01-06 RX ADMIN — HEPARIN SODIUM 5000 UNIT(S): 5000 INJECTION INTRAVENOUS; SUBCUTANEOUS at 21:14

## 2022-01-06 RX ADMIN — HEPARIN SODIUM 5000 UNIT(S): 5000 INJECTION INTRAVENOUS; SUBCUTANEOUS at 05:45

## 2022-01-06 RX ADMIN — Medication 150 MICROGRAM(S): at 05:45

## 2022-01-06 NOTE — PHARMACOTHERAPY INTERVENTION NOTE - COMMENTS
Recommended switching metronidazole from IV to PO due to IV metronidazole shortage, but the patient is going to the OR today and today will be the last day of therapy.

## 2022-01-06 NOTE — PROGRESS NOTE ADULT - NSPROGADDITIONALINFOA_GEN_ALL_CORE
Patient admitted with Cholelithiasis and acute gallstone pancreatitis. Radiology studies reviewed. Tender RUQ abdomen. Laparoscopic cholecystectomy possible open was discussed. The potential for bleeding, CBD injury, bowel injury and other related complications were discussed. All the questions were answered.  Informed consent for laparoscopic cholecystectomy possible open surgery was obtained

## 2022-01-07 ENCOUNTER — RESULT REVIEW (OUTPATIENT)
Age: 47
End: 2022-01-07

## 2022-01-07 LAB
ALBUMIN SERPL ELPH-MCNC: 3 G/DL — LOW (ref 3.5–5)
ALP SERPL-CCNC: 90 U/L — SIGNIFICANT CHANGE UP (ref 40–120)
ALT FLD-CCNC: 265 U/L DA — HIGH (ref 10–60)
ANION GAP SERPL CALC-SCNC: 6 MMOL/L — SIGNIFICANT CHANGE UP (ref 5–17)
APTT BLD: 34.7 SEC — SIGNIFICANT CHANGE UP (ref 27.5–35.5)
AST SERPL-CCNC: 68 U/L — HIGH (ref 10–40)
BILIRUB SERPL-MCNC: 0.5 MG/DL — SIGNIFICANT CHANGE UP (ref 0.2–1.2)
BLD GP AB SCN SERPL QL: SIGNIFICANT CHANGE UP
BUN SERPL-MCNC: 6 MG/DL — LOW (ref 7–18)
CALCIUM SERPL-MCNC: 9.2 MG/DL — SIGNIFICANT CHANGE UP (ref 8.4–10.5)
CHLORIDE SERPL-SCNC: 107 MMOL/L — SIGNIFICANT CHANGE UP (ref 96–108)
CO2 SERPL-SCNC: 28 MMOL/L — SIGNIFICANT CHANGE UP (ref 22–31)
CREAT SERPL-MCNC: 0.87 MG/DL — SIGNIFICANT CHANGE UP (ref 0.5–1.3)
GLUCOSE SERPL-MCNC: 89 MG/DL — SIGNIFICANT CHANGE UP (ref 70–99)
HCT VFR BLD CALC: 41.9 % — SIGNIFICANT CHANGE UP (ref 34.5–45)
HGB BLD-MCNC: 14.4 G/DL — SIGNIFICANT CHANGE UP (ref 11.5–15.5)
INR BLD: 1.13 RATIO — SIGNIFICANT CHANGE UP (ref 0.88–1.16)
MCHC RBC-ENTMCNC: 29.5 PG — SIGNIFICANT CHANGE UP (ref 27–34)
MCHC RBC-ENTMCNC: 34.4 GM/DL — SIGNIFICANT CHANGE UP (ref 32–36)
MCV RBC AUTO: 85.9 FL — SIGNIFICANT CHANGE UP (ref 80–100)
NRBC # BLD: 0 /100 WBCS — SIGNIFICANT CHANGE UP (ref 0–0)
PLATELET # BLD AUTO: 343 K/UL — SIGNIFICANT CHANGE UP (ref 150–400)
POTASSIUM SERPL-MCNC: 4.8 MMOL/L — SIGNIFICANT CHANGE UP (ref 3.5–5.3)
POTASSIUM SERPL-SCNC: 4.8 MMOL/L — SIGNIFICANT CHANGE UP (ref 3.5–5.3)
PROT SERPL-MCNC: 7.1 G/DL — SIGNIFICANT CHANGE UP (ref 6–8.3)
PROTHROM AB SERPL-ACNC: 13.4 SEC — SIGNIFICANT CHANGE UP (ref 10.6–13.6)
RBC # BLD: 4.88 M/UL — SIGNIFICANT CHANGE UP (ref 3.8–5.2)
RBC # FLD: 12.4 % — SIGNIFICANT CHANGE UP (ref 10.3–14.5)
SODIUM SERPL-SCNC: 141 MMOL/L — SIGNIFICANT CHANGE UP (ref 135–145)
WBC # BLD: 6.67 K/UL — SIGNIFICANT CHANGE UP (ref 3.8–10.5)
WBC # FLD AUTO: 6.67 K/UL — SIGNIFICANT CHANGE UP (ref 3.8–10.5)

## 2022-01-07 PROCEDURE — 88304 TISSUE EXAM BY PATHOLOGIST: CPT | Mod: 26

## 2022-01-07 PROCEDURE — 47562 LAPAROSCOPIC CHOLECYSTECTOMY: CPT

## 2022-01-07 DEVICE — CLIP APPLIER COVIDIEN ENDOCLIP 10MM LARGE: Type: IMPLANTABLE DEVICE | Status: FUNCTIONAL

## 2022-01-07 RX ORDER — FENTANYL CITRATE 50 UG/ML
50 INJECTION INTRAVENOUS
Refills: 0 | Status: DISCONTINUED | OUTPATIENT
Start: 2022-01-07 | End: 2022-01-07

## 2022-01-07 RX ORDER — MORPHINE SULFATE 50 MG/1
4 CAPSULE, EXTENDED RELEASE ORAL EVERY 4 HOURS
Refills: 0 | Status: DISCONTINUED | OUTPATIENT
Start: 2022-01-07 | End: 2022-01-08

## 2022-01-07 RX ORDER — FENTANYL CITRATE 50 UG/ML
25 INJECTION INTRAVENOUS
Refills: 0 | Status: DISCONTINUED | OUTPATIENT
Start: 2022-01-07 | End: 2022-01-07

## 2022-01-07 RX ORDER — ACETAMINOPHEN 500 MG
650 TABLET ORAL EVERY 6 HOURS
Refills: 0 | Status: DISCONTINUED | OUTPATIENT
Start: 2022-01-07 | End: 2022-01-08

## 2022-01-07 RX ORDER — TRAMADOL HYDROCHLORIDE 50 MG/1
50 TABLET ORAL EVERY 4 HOURS
Refills: 0 | Status: DISCONTINUED | OUTPATIENT
Start: 2022-01-07 | End: 2022-01-08

## 2022-01-07 RX ORDER — SODIUM CHLORIDE 9 MG/ML
1000 INJECTION, SOLUTION INTRAVENOUS
Refills: 0 | Status: DISCONTINUED | OUTPATIENT
Start: 2022-01-07 | End: 2022-01-07

## 2022-01-07 RX ORDER — ONDANSETRON 8 MG/1
4 TABLET, FILM COATED ORAL EVERY 6 HOURS
Refills: 0 | Status: DISCONTINUED | OUTPATIENT
Start: 2022-01-07 | End: 2022-01-08

## 2022-01-07 RX ORDER — LEVOTHYROXINE SODIUM 125 MCG
150 TABLET ORAL DAILY
Refills: 0 | Status: DISCONTINUED | OUTPATIENT
Start: 2022-01-07 | End: 2022-01-08

## 2022-01-07 RX ORDER — SODIUM CHLORIDE 9 MG/ML
1000 INJECTION INTRAMUSCULAR; INTRAVENOUS; SUBCUTANEOUS
Refills: 0 | Status: DISCONTINUED | OUTPATIENT
Start: 2022-01-07 | End: 2022-01-08

## 2022-01-07 RX ADMIN — MORPHINE SULFATE 4 MILLIGRAM(S): 50 CAPSULE, EXTENDED RELEASE ORAL at 14:02

## 2022-01-07 RX ADMIN — ONDANSETRON 4 MILLIGRAM(S): 8 TABLET, FILM COATED ORAL at 14:06

## 2022-01-07 RX ADMIN — Medication 650 MILLIGRAM(S): at 11:17

## 2022-01-07 RX ADMIN — TRAMADOL HYDROCHLORIDE 50 MILLIGRAM(S): 50 TABLET ORAL at 22:28

## 2022-01-07 RX ADMIN — MORPHINE SULFATE 4 MILLIGRAM(S): 50 CAPSULE, EXTENDED RELEASE ORAL at 13:51

## 2022-01-07 RX ADMIN — Medication 100 MILLIGRAM(S): at 05:04

## 2022-01-07 RX ADMIN — TRAMADOL HYDROCHLORIDE 50 MILLIGRAM(S): 50 TABLET ORAL at 11:30

## 2022-01-07 RX ADMIN — Medication 150 MICROGRAM(S): at 05:04

## 2022-01-07 RX ADMIN — Medication 650 MILLIGRAM(S): at 17:47

## 2022-01-07 RX ADMIN — FENTANYL CITRATE 50 MICROGRAM(S): 50 INJECTION INTRAVENOUS at 10:30

## 2022-01-07 RX ADMIN — Medication 650 MILLIGRAM(S): at 18:10

## 2022-01-07 RX ADMIN — SODIUM CHLORIDE 100 MILLILITER(S): 9 INJECTION INTRAMUSCULAR; INTRAVENOUS; SUBCUTANEOUS at 11:17

## 2022-01-07 RX ADMIN — TRAMADOL HYDROCHLORIDE 50 MILLIGRAM(S): 50 TABLET ORAL at 21:51

## 2022-01-07 RX ADMIN — TRAMADOL HYDROCHLORIDE 50 MILLIGRAM(S): 50 TABLET ORAL at 13:51

## 2022-01-07 RX ADMIN — Medication 200 MILLIGRAM(S): at 05:05

## 2022-01-07 RX ADMIN — FENTANYL CITRATE 50 MICROGRAM(S): 50 INJECTION INTRAVENOUS at 10:15

## 2022-01-07 RX ADMIN — Medication 650 MILLIGRAM(S): at 11:00

## 2022-01-07 NOTE — CHART NOTE - NSCHARTNOTEFT_GEN_A_CORE
HPI  46F with pmhx of hypothyroidism presenting for abdominal pain. States started Sunday night with associated episodes of nonbilious, non bloody vomiting.  Found to have gallstone pancreatitis which was medically managed with improving symptoms and downtrending lipase.  Patient taken for laparoscopic cholecystectomy on 1/17/21 without complication.    Patient seen and examined at bedside.  States that she was in pain requiring morphine with significant improvement.  Denies any shortness of breath or chest pain.  States that she was able to ambulate and void, was also able to tolerate clear liquids.    Vital Signs Last 24 Hrs  T(F): 98.1 (07 Jan 2022 13:30), Max: 98.1 (07 Jan 2022 13:30)  HR: 72 (07 Jan 2022 13:30) (54 - 85)  BP: 126/69 (07 Jan 2022 13:30) (95/47 - 127/61)  BP(mean): 82 (07 Jan 2022 13:30) (58 - 82)  RR: 16 (07 Jan 2022 13:30) (13 - 20)  SpO2: 98% (07 Jan 2022 13:30) (96% - 100%)    Gen: AAOx3, NAD, mentating normally  CV: RRR,   Pulm: Breathing comfortably on RA. Equal chest rise b/l.   Abd: Soft, non-tender, non-distended. No rebound or guarding. minimal tenderness around incision sites, apical bandaid with some saturation    46F s/p laparoscopic cholecystectomy for gallstone pancreatitis  -pain control  -out of bed as tolerated  -advance diet as tolerated  -incentive spirometer  -plan for likely DC tomorrow

## 2022-01-07 NOTE — BRIEF OPERATIVE NOTE - PRIMARY SURGEON
11/11/21                            Moon Hernandez  [address]  [facility] [address]    To Whom It May Concern:    This is to certify Mono Hernandez was evaluated with [facility] on 11/11/21 and should be able to work; however, requires hourly water breaks and every 2 hour sitting breaks.     RESTRICTIONS: water breaks, sitting breaks              [facility]  [facility] [place] [address]  [address]  [facility] [address]  [facility] [address]  Phone: [phone]    
Dr. Lex Jaimes

## 2022-01-08 ENCOUNTER — TRANSCRIPTION ENCOUNTER (OUTPATIENT)
Age: 47
End: 2022-01-08

## 2022-01-08 VITALS
SYSTOLIC BLOOD PRESSURE: 109 MMHG | TEMPERATURE: 98 F | RESPIRATION RATE: 16 BRPM | OXYGEN SATURATION: 98 % | DIASTOLIC BLOOD PRESSURE: 53 MMHG | HEART RATE: 75 BPM

## 2022-01-08 LAB
ANION GAP SERPL CALC-SCNC: 8 MMOL/L — SIGNIFICANT CHANGE UP (ref 5–17)
BASOPHILS # BLD AUTO: 0.04 K/UL — SIGNIFICANT CHANGE UP (ref 0–0.2)
BASOPHILS NFR BLD AUTO: 0.4 % — SIGNIFICANT CHANGE UP (ref 0–2)
BUN SERPL-MCNC: 6 MG/DL — LOW (ref 7–18)
CALCIUM SERPL-MCNC: 8.8 MG/DL — SIGNIFICANT CHANGE UP (ref 8.4–10.5)
CHLORIDE SERPL-SCNC: 109 MMOL/L — HIGH (ref 96–108)
CO2 SERPL-SCNC: 24 MMOL/L — SIGNIFICANT CHANGE UP (ref 22–31)
CREAT SERPL-MCNC: 0.67 MG/DL — SIGNIFICANT CHANGE UP (ref 0.5–1.3)
EOSINOPHIL # BLD AUTO: 0.08 K/UL — SIGNIFICANT CHANGE UP (ref 0–0.5)
EOSINOPHIL NFR BLD AUTO: 0.9 % — SIGNIFICANT CHANGE UP (ref 0–6)
GLUCOSE SERPL-MCNC: 80 MG/DL — SIGNIFICANT CHANGE UP (ref 70–99)
HCT VFR BLD CALC: 36.6 % — SIGNIFICANT CHANGE UP (ref 34.5–45)
HGB BLD-MCNC: 12.6 G/DL — SIGNIFICANT CHANGE UP (ref 11.5–15.5)
IMM GRANULOCYTES NFR BLD AUTO: 0.5 % — SIGNIFICANT CHANGE UP (ref 0–1.5)
LYMPHOCYTES # BLD AUTO: 2.51 K/UL — SIGNIFICANT CHANGE UP (ref 1–3.3)
LYMPHOCYTES # BLD AUTO: 26.7 % — SIGNIFICANT CHANGE UP (ref 13–44)
MCHC RBC-ENTMCNC: 29.4 PG — SIGNIFICANT CHANGE UP (ref 27–34)
MCHC RBC-ENTMCNC: 34.4 GM/DL — SIGNIFICANT CHANGE UP (ref 32–36)
MCV RBC AUTO: 85.3 FL — SIGNIFICANT CHANGE UP (ref 80–100)
MONOCYTES # BLD AUTO: 1.09 K/UL — HIGH (ref 0–0.9)
MONOCYTES NFR BLD AUTO: 11.6 % — SIGNIFICANT CHANGE UP (ref 2–14)
NEUTROPHILS # BLD AUTO: 5.64 K/UL — SIGNIFICANT CHANGE UP (ref 1.8–7.4)
NEUTROPHILS NFR BLD AUTO: 59.9 % — SIGNIFICANT CHANGE UP (ref 43–77)
NRBC # BLD: 0 /100 WBCS — SIGNIFICANT CHANGE UP (ref 0–0)
PLATELET # BLD AUTO: 333 K/UL — SIGNIFICANT CHANGE UP (ref 150–400)
POTASSIUM SERPL-MCNC: 3.7 MMOL/L — SIGNIFICANT CHANGE UP (ref 3.5–5.3)
POTASSIUM SERPL-SCNC: 3.7 MMOL/L — SIGNIFICANT CHANGE UP (ref 3.5–5.3)
RBC # BLD: 4.29 M/UL — SIGNIFICANT CHANGE UP (ref 3.8–5.2)
RBC # FLD: 12.4 % — SIGNIFICANT CHANGE UP (ref 10.3–14.5)
SODIUM SERPL-SCNC: 141 MMOL/L — SIGNIFICANT CHANGE UP (ref 135–145)
WBC # BLD: 9.41 K/UL — SIGNIFICANT CHANGE UP (ref 3.8–10.5)
WBC # FLD AUTO: 9.41 K/UL — SIGNIFICANT CHANGE UP (ref 3.8–10.5)

## 2022-01-08 PROCEDURE — 74181 MRI ABDOMEN W/O CONTRAST: CPT

## 2022-01-08 PROCEDURE — G1004: CPT

## 2022-01-08 PROCEDURE — 84478 ASSAY OF TRIGLYCERIDES: CPT

## 2022-01-08 PROCEDURE — 96374 THER/PROPH/DIAG INJ IV PUSH: CPT

## 2022-01-08 PROCEDURE — 76705 ECHO EXAM OF ABDOMEN: CPT

## 2022-01-08 PROCEDURE — 85610 PROTHROMBIN TIME: CPT

## 2022-01-08 PROCEDURE — 86901 BLOOD TYPING SEROLOGIC RH(D): CPT

## 2022-01-08 PROCEDURE — 85027 COMPLETE CBC AUTOMATED: CPT

## 2022-01-08 PROCEDURE — 84702 CHORIONIC GONADOTROPIN TEST: CPT

## 2022-01-08 PROCEDURE — 87635 SARS-COV-2 COVID-19 AMP PRB: CPT

## 2022-01-08 PROCEDURE — 96375 TX/PRO/DX INJ NEW DRUG ADDON: CPT

## 2022-01-08 PROCEDURE — 85730 THROMBOPLASTIN TIME PARTIAL: CPT

## 2022-01-08 PROCEDURE — 80076 HEPATIC FUNCTION PANEL: CPT

## 2022-01-08 PROCEDURE — 80053 COMPREHEN METABOLIC PANEL: CPT

## 2022-01-08 PROCEDURE — 93005 ELECTROCARDIOGRAM TRACING: CPT

## 2022-01-08 PROCEDURE — 85025 COMPLETE CBC W/AUTO DIFF WBC: CPT

## 2022-01-08 PROCEDURE — C1889: CPT

## 2022-01-08 PROCEDURE — 88304 TISSUE EXAM BY PATHOLOGIST: CPT

## 2022-01-08 PROCEDURE — 36415 COLL VENOUS BLD VENIPUNCTURE: CPT

## 2022-01-08 PROCEDURE — 86850 RBC ANTIBODY SCREEN: CPT

## 2022-01-08 PROCEDURE — 87086 URINE CULTURE/COLONY COUNT: CPT

## 2022-01-08 PROCEDURE — 80048 BASIC METABOLIC PNL TOTAL CA: CPT

## 2022-01-08 PROCEDURE — 83690 ASSAY OF LIPASE: CPT

## 2022-01-08 PROCEDURE — 81001 URINALYSIS AUTO W/SCOPE: CPT

## 2022-01-08 PROCEDURE — 86900 BLOOD TYPING SEROLOGIC ABO: CPT

## 2022-01-08 PROCEDURE — 99285 EMERGENCY DEPT VISIT HI MDM: CPT | Mod: 25

## 2022-01-08 PROCEDURE — 74177 CT ABD & PELVIS W/CONTRAST: CPT | Mod: MG

## 2022-01-08 RX ORDER — DOCUSATE SODIUM 100 MG
1 CAPSULE ORAL
Qty: 90 | Refills: 0
Start: 2022-01-08 | End: 2022-02-06

## 2022-01-08 RX ORDER — TRAMADOL HYDROCHLORIDE 50 MG/1
1 TABLET ORAL
Qty: 12 | Refills: 0
Start: 2022-01-08 | End: 2022-01-09

## 2022-01-08 RX ORDER — ACETAMINOPHEN 500 MG
2 TABLET ORAL
Qty: 0 | Refills: 0 | DISCHARGE
Start: 2022-01-08

## 2022-01-08 RX ORDER — SENNA PLUS 8.6 MG/1
1 TABLET ORAL
Qty: 30 | Refills: 0
Start: 2022-01-08 | End: 2022-02-06

## 2022-01-08 RX ORDER — POTASSIUM CHLORIDE 20 MEQ
20 PACKET (EA) ORAL ONCE
Refills: 0 | Status: COMPLETED | OUTPATIENT
Start: 2022-01-08 | End: 2022-01-08

## 2022-01-08 RX ADMIN — Medication 650 MILLIGRAM(S): at 06:13

## 2022-01-08 RX ADMIN — Medication 20 MILLIEQUIVALENT(S): at 11:36

## 2022-01-08 RX ADMIN — Medication 650 MILLIGRAM(S): at 11:36

## 2022-01-08 RX ADMIN — TRAMADOL HYDROCHLORIDE 50 MILLIGRAM(S): 50 TABLET ORAL at 02:11

## 2022-01-08 RX ADMIN — SODIUM CHLORIDE 100 MILLILITER(S): 9 INJECTION INTRAMUSCULAR; INTRAVENOUS; SUBCUTANEOUS at 08:48

## 2022-01-08 RX ADMIN — Medication 150 MICROGRAM(S): at 05:41

## 2022-01-08 RX ADMIN — Medication 650 MILLIGRAM(S): at 05:41

## 2022-01-08 RX ADMIN — TRAMADOL HYDROCHLORIDE 50 MILLIGRAM(S): 50 TABLET ORAL at 02:46

## 2022-01-08 NOTE — DISCHARGE NOTE PROVIDER - NSDCCPCAREPLAN_GEN_ALL_CORE_FT
PRINCIPAL DISCHARGE DIAGNOSIS  Diagnosis: Gallstone pancreatitis  Assessment and Plan of Treatment: you had gallstones that caused obstruction in the outflow from your pancreas. You had a cholecystectomy that removed your gallbladder to stop future reoccurance of this

## 2022-01-08 NOTE — DISCHARGE NOTE PROVIDER - HOSPITAL COURSE
46F with pmhx of hypothyroidism presenting for abdominal pain. States started Sunday night with associated episodes of nonbilious, non bloody vomiting.  Patient admitted on 1/4/22.  Found to have gallstone pancreatitis which was medically managed with improving symptoms and downtrending lipase.  MRCP preformed and negative for obstructing pathologies.  Patient taken for laparoscopic cholecystectomy on 1/17/21 without complication. Postoperative course uncomplicated. Patient stated that she was able to ambulate and void, was also able to tolerate diet without nausea or vomiting.  Post operative instructions provided and patient stable for discharge on post op day 1

## 2022-01-08 NOTE — DISCHARGE NOTE NURSING/CASE MANAGEMENT/SOCIAL WORK - NSDCPEFALRISK_GEN_ALL_CORE
For information on Fall & Injury Prevention, visit: [email or web address] OR  [email or web address] OR  https://www.cdc.gov/steadi/patient.html

## 2022-01-08 NOTE — DISCHARGE NOTE PROVIDER - NSDCCPTREATMENT_GEN_ALL_CORE_FT
PRINCIPAL PROCEDURE  Procedure: Laparoscopic cholecystectomy  Findings and Treatment: You have had a “Laparoscopic Cholecystectomy”. This just means your Gallbladder was removed using a small camera and instruments through 4 separate small incisions. After having your Gallbladder removed, there are no limitations on your diet. After the surgery, you may feel full or nauseated easily with a large meal. The bowels usually need some time to begin functioning normally again, so the first one or two days after the surgery this is normal. If it persists beyond one or two days, you should call the office for advice. Some people experience diarrhea after the Gallbladder is removed. Most people have this resolve in the first few months, and others never experience diarrhea. If you are experiencing diarrhea for more than a couple of weeks, please contact the office for advice.  The incisions have “Steri-Strips” (or small tape) across them, and a plastic adhesive dressing over the top to protect them from  You may wash over the plastic dressings in the shower. Please do not soak in a tub or bath as this may loosen the plastic dressings and they may fall off, leaving the incisions exposed. After about 48-72 hours, the incisions in the skin are watertight, but they are still very weak, so it’s important to protect them for about 7 days. Leave the plastic dressings on for at least 72 hours after the operation. You may remove the plastic, gauze and cotton ball after 72 hours, but leave the Steri-Strips in place.  You may then wash gently with soap and water over the Steri-strips, but be sure to dry them well after your shower.  If the Steri-strips have not fallen off by themselves in 7 days after your surgery, you may remove them.  Your activity level should be reduced the first two weeks after the surgery. The incisions are about 60% strength of normal tissue at 6 weeks. I generally advise not to lift anything more than 15lbs. for about 6 weeks. You can increase your activity to moderate exercise after the first two weeks. I often advise to “let pain be your

## 2022-01-08 NOTE — DISCHARGE NOTE PROVIDER - NSDCMRMEDTOKEN_GEN_ALL_CORE_FT
acetaminophen 325 mg oral tablet: 2 tab(s) orally every 6 hours  Synthroid 150 mcg (0.15 mg) oral tablet: 1 tab(s) orally once a day  Vitamin C 500 mg oral tablet: 2 tab(s) orally once a day  Vitamin D3 1000 intl units (25 mcg) oral tablet: 1  orally once a day   acetaminophen 325 mg oral tablet: 2 tab(s) orally every 6 hours  Colace 100 mg oral capsule: 1 cap(s) orally 3 times a day, As Needed -for constipationMDD:3   senna 8.6 mg oral tablet: 1 tab(s) orally once a day, As Needed   Synthroid 150 mcg (0.15 mg) oral tablet: 1 tab(s) orally once a day  traMADol 50 mg oral tablet: 1 tab(s) orally every 4 hours, As needed, Moderate Pain (4 - 6) MDD:4 tablets  Vitamin C 500 mg oral tablet: 2 tab(s) orally once a day  Vitamin D3 1000 intl units (25 mcg) oral tablet: 1  orally once a day

## 2022-01-08 NOTE — PROGRESS NOTE ADULT - ASSESSMENT
46F s/p laparoscopic cholecystectomy for gallstone pancreatitis, pod 1  -pain control  -out of bed as tolerated  -c/w diet  -incentive spirometer  -labs reviewed  - dc home today
46 year old female with gallstone pancreatitis     afebrile, wbc wnl   AST/ALT trending down  lipase trending down    -f/u MRCP to r/o choledocholithiasis  -pain control PRN  -Antiemetic PRN  -NPO, IVF  -Plan for laparoscopic cholecystectomy on this admission  -DVT prophylaxis   
46F female with gallstone pancreatitis   afebrile, wbc wnl, LFTs downtrending, clinically improving, MRCP negative    -Plan for laparoscopic cholecystectomy tomorrow  -pain control PRN  -Antiemetic PRN  -NPO, IVF  -DVT prophylaxis

## 2022-01-08 NOTE — PROGRESS NOTE ADULT - REASON FOR ADMISSION
Gallstone Pancreatitis

## 2022-01-08 NOTE — DISCHARGE NOTE PROVIDER - CARE PROVIDER_API CALL
Lex Jaimes)  Surgery  [address] [place], [place] [id number]  Phone: [phone]  Fax: [phone]  Established Patient  Follow Up Time: 2 weeks

## 2022-01-08 NOTE — PROGRESS NOTE ADULT - SUBJECTIVE AND OBJECTIVE BOX
History of Present Illness:  Reason for Admission: Gallstone Pancreatitis  History of Present Illness:   46 year old female, with PMH hypothyroid, seen in the ED for US and CT findings of gallstone pancreatitis. Patient states that she came to the ED after having severe abdominal pain on  night, with associated vomiting. She went to [facility] on  night but stated that she was uncomfortable due to the high prevalence of COVID in the ED and she refused any radiographic exams at the time and left the hospital.  She states that her pain improved and she ate bread with cheese today and went to work but then she started experiencing 7/10 pain, which brought her to the ED today. She had some nausea prior to admission. She states that the pain is epigastric and radiates to the RUQ and back. She states she had a bowel movement yesterday and has been passing gas. She denies any N/V, fever, dysuria, hematuria, or any other urinary complaints, chills, SOB, chest pain or any constitutional symptoms at this time.      Review of Systems:  Other Review of Systems: All other review of systems negative, except as noted in HPI      Allergies and Intolerances:        Allergies:  	No Known Allergies:     Home Medications:   * Patient Currently Takes Medications as of 2022 17:18 documented in Structured Notes  · 	Synthroid 150 mcg (0.15 mg) oral tablet: 1 tab(s) orally once a day  · 	Vitamin C 500 mg oral tablet: 2 tab(s) orally once a day  · 	Vitamin D3 1000 intl units (25 mcg) oral tablet: 1  orally once a day    Patient History:    Past Medical, Past Surgical, and Family History:  PAST MEDICAL HISTORY:  Hypothyroidism     Left breast mass.     PAST SURGICAL HISTORY:  H/O 2 abortions ,     H/O  section 2006    H/O right breast biopsy 2019    Obese     S/P fine needle aspiration left breast-2020.     FAMILY HISTORY:  FH: stomach cancer, father.     Social History:  Social History (marital status, living situation, occupation, tobacco use, alcohol and drug use, and sexual history): Patient denies tobacco use.     Tobacco Screening:  · Core Measure Site	Yes  · Has the patient used tobacco in the past 30 days?	No    Risk Assessment:    Present on Admission:  Deep Venous Thrombosis	no  Pulmonary Embolus	no     Heart Failure:  Does this patient have a history of or has been diagnosed with heart failure? no.    HIV Screen (per [facility] Department of Health, HIV screening must be offered to every individual between ages 13 and 64)	Offered and patient declined    Physical Exam:   Physical Exam: General: Well nourished/Well developed, NAD  Neurology: A&Ox3,   Respiratory: unlabored  Abdominal: Soft, tender to palpation in the epigastric and LUQ and mild tenderness to LLQ, Nondistended  Extremities: no calf pain bilaterally       Labs and Results:  Labs, Radiology, Cardiology, and Other Results: Vital Signs Last 24 Hrs  T(C): 36.7 (2022 16:42), Max: 36.8 (2022 10:05)  T(F): 98 (2022 16:42), Max: 98.2 (2022 10:05)  HR: 61 (2022 16:42) (61 - 71)  BP: 121/58 (2022 16:42) (114/65 - 121/58)  BP(mean): --  RR: 20 (2022 16:42) (16 - 20)  SpO2: 99% (2022 16:42) (97% - 99%)      I&O's Detail      LABS:                        15.5   10.23 )-----------( 365      ( 2022 11:02 )             47.0                 139  |  102  |  11  ----------------------------<  98  3.9   |  31  |  0.81    Ca    9.5      2022 11:02    TPro  8.0  /  Alb  3.6  /  TBili  1.0  /  DBili  x   /  AST  328<H>  /  ALT  879<H>  /  AlkPhos  150<H>            < from: CT Abdomen and Pelvis w/ IV Cont (22 @ 13:56) >    LIVER: Within normal limits.  BILE DUCTS: Normal caliber.  GALLBLADDER: Cholelithiasis. Possible trace pericholecystic fluid.   Correlate with right upper quadrant ultrasound..  SPLEEN: Within normal limits.  PANCREAS: Diffusely enlarged  may represent pancreatitis. Correlate with   lipase levels. Underlying occult neoplasm not excluded. Recommend   short-term follow-up CT or MRI. No pancreatic necrosis abscess or   pseudocyst..  ADRENALS: Within normal limits.  KIDNEYS/URETERS: Within normal limits.    BLADDER: Not adequately distended.  REPRODUCTIVE ORGANS: Very bulky heterogeneous uterus may represent   myomatous changes. Neoplastic involvement not excluded. Correlate with   pelvic ultrasound    BOWEL: No bowel obstruction or acute inflammation. Appendix normal  PERITONEUM: No ascites.  VESSELS: Nonaneurysmal  RETROPERITONEUM/LYMPH NODES: No lymphadenopathy.  ABDOMINAL WALL: Within normal limits.  BONES: No acute or aggressive pathology.    IMPRESSION:  Cholelithiasis with possible trace pericholecystic fluid. Correlate with   right upper quadrant ultrasound    Diffusely enlarged pancreas. Correlate with lipase levels for possible   pancreatitis.    Very bulky heterogeneous uterus as described. Correlate pelvic ultrasound.    < end of copied text >    < from: US Abdomen Upper Quadrant Right (22 @ 15:49) >      FINDINGS:    Liver: Within normal limits.  Bile ducts: Normal caliber. Common bile duct measures 4 mm.  Gallbladder: Cholelithiasis. Mild diffuse gallbladder wall thickening   possible cholecystitis. No pericholecystic fluid. Correlate with HIDA   scan as warranted.  Pancreas: Diffusely enlarged as noted on CT of the same day. Correlate   with lipase levels for pancreatitis  Right kidney: 12.2 cm. No hydronephrosis.  Ascites: None.  IVC: Visualized portions are within normal limits.    IMPRESSION:    Cholelithiasis without gallbladder wall thickening possible   cholecystitis. Correlate with HIDA scan as warranted. No biliary   dilatation.  Diffusely enlarged pancreas as noted on CT of the same day. Correlate   with lipase levels for pancreatitis.    < end of copied text >    Assessment and Plan:    Assessment:  · Assessment	  46 year old female with gallstone pancreatitis   afebrile, wbc wnl   Lipase [id number]  + UA, asx; on IV abx, await urine culture results   Elevated LFT; Alk Phos 150, Ast 328, Alt 839- GI consult     - surgical f/up appret  - NPO, IVF  - IV abx  - DVT ppx, OOB and ambulate as tolerated  - Antipyretic, antiemetic as needed    - GI consult  - f/u urine culture   - Trend Lipase, trend LFT  - pending for mrcp      
INTERVAL HPI/OVERNIGHT EVENTS:  No acute events overnight. Pt resting comfortably. No acute complaints.     MEDICATIONS  (STANDING):  ciprofloxacin   IVPB 400 milliGRAM(s) IV Intermittent every 12 hours  heparin   Injectable 5000 Unit(s) SubCutaneous every 8 hours  influenza   Vaccine 0.5 milliLiter(s) IntraMuscular once  lactated ringers. 1000 milliLiter(s) (150 mL/Hr) IV Continuous <Continuous>  levothyroxine 150 MICROGram(s) Oral daily  metroNIDAZOLE  IVPB 500 milliGRAM(s) IV Intermittent every 8 hours  metroNIDAZOLE  IVPB        MEDICATIONS  (PRN):  acetaminophen     Tablet .. 650 milliGRAM(s) Oral every 6 hours PRN Temp greater or equal to 38C (100.4F), Mild Pain (1 - 3)  HYDROmorphone  Injectable 0.5 milliGRAM(s) IV Push every 4 hours PRN Severe Pain (7 - 10)  ketorolac   Injectable 15 milliGRAM(s) IV Push every 6 hours PRN Moderate Pain (4 - 6)  ondansetron Injectable 4 milliGRAM(s) IV Push every 6 hours PRN Nausea      Vital Signs Last 24 Hrs  T(C): 36.6 (06 Jan 2022 05:09), Max: 37 (05 Jan 2022 21:43)  T(F): 97.8 (06 Jan 2022 05:09), Max: 98.6 (05 Jan 2022 21:43)  HR: 56 (06 Jan 2022 05:09) (56 - 64)  BP: 112/51 (06 Jan 2022 05:09) (112/51 - 125/79)  BP(mean): 65 (06 Jan 2022 05:09) (65 - 65)  RR: 16 (06 Jan 2022 05:09) (16 - 18)  SpO2: 97% (06 Jan 2022 05:09) (96% - 99%)    Physical:  General: Alert and oriented, not in acute distress  Resp: Breathing unlabored  Abdomen: Soft, nondistended, nontender, no rebound  : No willett catheter, no dysuria or hematuria  Extremities: No pedal edema    LABS:                      13.0   7.20  )-----------( 278      ( 05 Jan 2022 06:25 )             39.6             01-05    139  |  108  |  10  ----------------------------<  108<H>  3.5   |  22  |  0.76    Ca    8.4      05 Jan 2022 06:25    TPro  6.1  /  Alb  2.8<L>  /  TBili  0.6  /  DBili  0.1  /  AST  91<H>  /  ALT  451<H>  /  AlkPhos  99  01-05      < from: MR MRCP No Cont (01.05.22 @ 14:56) >  PROCEDURE:  MRI/MRCP was performed. Radial and 3D MRCP sequences were obtained.  FINDINGS:  LOWER CHEST: T2 hyperintense cystic lesions in the right breast.  LIVER: Within normal limits.  BILEDUCTS: Normal caliber.  GALLBLADDER: Cholelithiasis and gallbladder wall edema.  SPLEEN: Within normal limits.  PANCREAS: Within normal limits.  ADRENALS: Within normal limits.  KIDNEYS/URETERS: Within normal limits.  VISUALIZED PORTIONS:  BOWEL: Within normal limits.  PERITONEUM: No ascites.  VESSELS: Within normal limits.  RETROPERITONEUM/LYMPH NODES: No lymphadenopathy.  ABDOMINAL WALL: Within normal limits.  BONES: Within normal limits.    IMPRESSION:  Cholelithiasis and gallbladder wall edema.  No choledocholithiasis or biliary ductal dilatation.  --- End of Report ---  YELENA RUEDA MD; Attending Radiologist  This document has been electronically signed. Jan 5 2022  3:26PM  < end of copied text >  
INTERVAL HPI/OVERNIGHT EVENTS:  Patient seen,stable clinically,episodic pain,no distress  VITAL SIGNS:  T(F): 98.8 (01-08-22 @ 05:00)  HR: 72 (01-08-22 @ 05:00)  BP: 112/62 (01-08-22 @ 05:00)  RR: 18 (01-08-22 @ 05:00)  SpO2: 96% (01-08-22 @ 05:00)  Wt(kg): --    PHYSICAL EXAM:  awake,no distress  Constitutional:  Eyes:  ENMT:perrla  Neck:  Respiratory:clear  Cardiovascular:s1s2,m-none  Gastrointestinal:soft,bs pos  Extremities:mild epigastric tendernes  Vascular:  Neurological:  Musculoskeletal:    MEDICATIONS  (STANDING):  acetaminophen     Tablet .. 650 milliGRAM(s) Oral every 6 hours  influenza   Vaccine 0.5 milliLiter(s) IntraMuscular once  levothyroxine 150 MICROGram(s) Oral daily  sodium chloride 0.9%. 1000 milliLiter(s) (100 mL/Hr) IV Continuous <Continuous>    MEDICATIONS  (PRN):  morphine  - Injectable 4 milliGRAM(s) IV Push every 4 hours PRN Severe Pain (7 - 10)  ondansetron Injectable 4 milliGRAM(s) IV Push every 6 hours PRN Nausea  traMADol 50 milliGRAM(s) Oral every 4 hours PRN Moderate Pain (4 - 6)      Allergies    No Known Allergies    Intolerances        LABS:                        12.6   9.41  )-----------( 333      ( 08 Jan 2022 07:10 )             36.6     01-08    141  |  109<H>  |  6<L>  ----------------------------<  80  3.7   |  24  |  0.67    Ca    8.8      08 Jan 2022 07:10    TPro  7.1  /  Alb  3.0<L>  /  TBili  0.5  /  DBili  x   /  AST  68<H>  /  ALT  265<H>  /  AlkPhos  90  01-07    PT/INR - ( 07 Jan 2022 07:26 )   PT: 13.4 sec;   INR: 1.13 ratio         PTT - ( 07 Jan 2022 07:26 )  PTT:34.7 sec      RADIOLOGY & ADDITIONAL TESTS:       Assessment:  · Assessment	  46 year old female with gallstone pancreatitis   - s/p surgery  - Antipyretic, antiemetic as needed    - pain control  - surgery team f/up  -cleared for d/c today      
  HPI  46F with pmhx of hypothyroidism presenting for abdominal pain. States started Sunday night with associated episodes of nonbilious, non bloody vomiting.  Found to have gallstone pancreatitis which was medically managed with improving symptoms and downtrending lipase.  Patient taken for laparoscopic cholecystectomy on 1/17/21 without complication.    Patient seen and examined at bedside. Pain is well controlled this morning, she is tolerating breakfast, ambulating, denies  CP/ SOB.  Post operative instructions provided. No acute events overnight.     Vital Signs Last 24 Hrs  T(F): 98.8 (08 Jan 2022 05:00), Max: 98.8 (08 Jan 2022 05:00)  HR: 72 (08 Jan 2022 05:00) (56 - 85)  BP: 112/62 (08 Jan 2022 05:00) (112/55 - 127/61)  RR: 18 (08 Jan 2022 05:00) (13 - 20)  SpO2: 96% (08 Jan 2022 05:00) (96% - 99%)    Gen: AAOx3, NAD, mentating normally  CV: RRR,   Pulm: Breathing comfortably on RA. Equal chest rise b/l.   Abd: Soft, non-tender, non-distended. No rebound or guarding. minimal tenderness around incision sites, apical bandaid with some saturation, same as prior                          12.6   9.41  )-----------( 333      ( 08 Jan 2022 07:10 )             36.6   MEDICATIONS  (STANDING):  acetaminophen     Tablet .. 650 milliGRAM(s) Oral every 6 hours  influenza   Vaccine 0.5 milliLiter(s) IntraMuscular once  levothyroxine 150 MICROGram(s) Oral daily  sodium chloride 0.9%. 1000 milliLiter(s) (100 mL/Hr) IV Continuous <Continuous>    MEDICATIONS  (PRN):  morphine  - Injectable 4 milliGRAM(s) IV Push every 4 hours PRN Severe Pain (7 - 10)  ondansetron Injectable 4 milliGRAM(s) IV Push every 6 hours PRN Nausea  traMADol 50 milliGRAM(s) Oral every 4 hours PRN Moderate Pain (4 - 6)    
INTERVAL HPI/OVERNIGHT EVENTS:  Patient seen,no acute events,mild pain  VITAL SIGNS:  T(F): 97.8 (22 @ 05:09)  HR: 56 (22 @ 05:09)  BP: 112/51 (22 @ 05:09)  RR: 16 (22 @ 05:09)  SpO2: 97% (22 @ 05:09)  Wt(kg): --    PHYSICAL EXAM:  awake,alert  Constitutional:  Eyes:  ENMT:perrla  Neck:  Respiratory:clear  Cardiovascular:s1s2,m-none  Gastrointestinal:soft,bs pos,mild reprod pain  Extremities:no edema  Vascular:  Neurological:no focal deficit  Musculoskeletal:    MEDICATIONS  (STANDING):  ciprofloxacin   IVPB 400 milliGRAM(s) IV Intermittent every 12 hours  heparin   Injectable 5000 Unit(s) SubCutaneous every 8 hours  influenza   Vaccine 0.5 milliLiter(s) IntraMuscular once  lactated ringers. 1000 milliLiter(s) (150 mL/Hr) IV Continuous <Continuous>  levothyroxine 150 MICROGram(s) Oral daily  metroNIDAZOLE  IVPB 500 milliGRAM(s) IV Intermittent every 8 hours  metroNIDAZOLE  IVPB        MEDICATIONS  (PRN):  acetaminophen     Tablet .. 650 milliGRAM(s) Oral every 6 hours PRN Temp greater or equal to 38C (100.4F), Mild Pain (1 - 3)  HYDROmorphone  Injectable 0.5 milliGRAM(s) IV Push every 4 hours PRN Severe Pain (7 - 10)  ketorolac   Injectable 15 milliGRAM(s) IV Push every 6 hours PRN Moderate Pain (4 - 6)  ondansetron Injectable 4 milliGRAM(s) IV Push every 6 hours PRN Nausea      Allergies    No Known Allergies    Intolerances        LABS:                        13.0   7.20  )-----------( 278      ( 2022 06:25 )             39.6     -    139  |  108  |  10  ----------------------------<  108<H>  3.5   |  22  |  0.76    Ca    8.4      2022 06:25    TPro  6.1  /  Alb  2.8<L>  /  TBili  0.6  /  DBili  0.1  /  AST  91<H>  /  ALT  451<H>  /  AlkPhos  99  -      Urinalysis Basic - ( 2022 11:13 )    Color: Yellow / Appearance: Clear / S.010 / pH: x  Gluc: x / Ketone: Negative  / Bili: Negative / Urobili: 1   Blood: x / Protein: 30 mg/dL / Nitrite: Negative   Leuk Esterase: Small / RBC: 2-5 /HPF / WBC 3-5 /HPF   Sq Epi: x / Non Sq Epi: Occasional /HPF / Bacteria: Many /HPF        RADIOLOGY & ADDITIONAL TESTS:       Assessment:  · Assessment	  46 year old female with gallstone pancreatitis   - surgical f/up appret  - NPO, IVF  - IV abx  - DVT ppx, OOB and ambulate as tolerated  - Antipyretic, antiemetic as needed    - GI consult appret,with suggestion for cholecystectomy  - Trend Lipase, trend LFT  -  mrcp resul;ts notiuced  -surgical f/up appret,scheduled for surgery on friday    
INTERVAL HPI/OVERNIGHT EVENTS:  Patient seen,stable ,s/p cholecystectomy  VITAL SIGNS:  T(F): 97.9 (01-07-22 @ 10:45)  HR: 58 (01-07-22 @ 10:45)  BP: 121/62 (01-07-22 @ 10:45)  RR: 17 (01-07-22 @ 10:45)  SpO2: 96% (01-07-22 @ 10:45)  Wt(kg): --    PHYSICAL EXAM:  awake,mild  pain ,no dystress  Constitutional:  Eyes:  ENMT:perrla  Neck:  Respiratory:clear  Cardiovascular:s12,m-none  Gastrointestinal:soft,bs pos  Extremities:  Vascular:  Neurological:no focal deficit  Musculoskeletal:    MEDICATIONS  (STANDING):  acetaminophen     Tablet .. 650 milliGRAM(s) Oral every 6 hours  influenza   Vaccine 0.5 milliLiter(s) IntraMuscular once  lactated ringers. 1000 milliLiter(s) (75 mL/Hr) IV Continuous <Continuous>  sodium chloride 0.9%. 1000 milliLiter(s) (100 mL/Hr) IV Continuous <Continuous>    MEDICATIONS  (PRN):  fentaNYL    Injectable 25 MICROGram(s) IV Push every 5 minutes PRN Moderate Pain (4 - 6)  fentaNYL    Injectable 50 MICROGram(s) IV Push every 5 minutes PRN Severe Pain (7 - 10)  morphine  - Injectable 4 milliGRAM(s) IV Push every 4 hours PRN Severe Pain (7 - 10)  ondansetron Injectable 4 milliGRAM(s) IV Push every 6 hours PRN Nausea  traMADol 50 milliGRAM(s) Oral every 4 hours PRN Moderate Pain (4 - 6)      Allergies    No Known Allergies    Intolerances        LABS:                        14.4   6.67  )-----------( 343      ( 07 Jan 2022 07:26 )             41.9     01-07    141  |  107  |  6<L>  ----------------------------<  89  4.8   |  28  |  0.87    Ca    9.2      07 Jan 2022 07:26    TPro  7.1  /  Alb  3.0<L>  /  TBili  0.5  /  DBili  x   /  AST  68<H>  /  ALT  265<H>  /  AlkPhos  90  01-07    PT/INR - ( 07 Jan 2022 07:26 )   PT: 13.4 sec;   INR: 1.13 ratio         PTT - ( 07 Jan 2022 07:26 )  PTT:34.7 sec      RADIOLOGY & ADDITIONAL TESTS:       Assessment:  · Assessment	  46 year old female with gallstone pancreatitis   - s/p surgery  -  IVF  - IV abx  - DVT ppx, OOB and ambulate as tolerated  - Antipyretic, antiemetic as needed    - pain control  - surgery team f/up  
INTERVAL HPI/OVERNIGHT EVENTS:  Pt resting comfortably. No acute complaints.   abdominal pain improved  complaining of nausea, denied vomiting  afebrile    MEDICATIONS  (STANDING):  ciprofloxacin   IVPB 400 milliGRAM(s) IV Intermittent every 12 hours  heparin   Injectable 5000 Unit(s) SubCutaneous every 8 hours  influenza   Vaccine 0.5 milliLiter(s) IntraMuscular once  lactated ringers. 1000 milliLiter(s) (150 mL/Hr) IV Continuous <Continuous>  levothyroxine 150 MICROGram(s) Oral daily  metroNIDAZOLE  IVPB 500 milliGRAM(s) IV Intermittent every 8 hours  metroNIDAZOLE  IVPB        MEDICATIONS  (PRN):  acetaminophen     Tablet .. 650 milliGRAM(s) Oral every 6 hours PRN Temp greater or equal to 38C (100.4F), Mild Pain (1 - 3)  HYDROmorphone  Injectable 0.5 milliGRAM(s) IV Push every 4 hours PRN Severe Pain (7 - 10)  ketorolac   Injectable 15 milliGRAM(s) IV Push every 6 hours PRN Moderate Pain (4 - 6)  ondansetron Injectable 4 milliGRAM(s) IV Push every 6 hours PRN Nausea    Vital Signs Last 24 Hrs  T(C): 36.5 (05 Jan 2022 04:52), Max: 36.7 (04 Jan 2022 16:42)  T(F): 97.7 (05 Jan 2022 04:52), Max: 98.1 (04 Jan 2022 21:50)  HR: 63 (05 Jan 2022 04:52) (54 - 68)  BP: 112/73 (05 Jan 2022 04:52) (102/65 - 121/58)  RR: 17 (05 Jan 2022 04:52) (17 - 20)  SpO2: 95% (05 Jan 2022 04:52) (95% - 99%)    Physical:  General: A&Ox3. NAD  Chest: respiration unlabored  Abdomen: Soft nondistended, minimal epigastric tenderness. no guarding     LABS:                        13.0   7.20  )-----------( 278      ( 05 Jan 2022 06:25 )             39.6             01-05    139  |  108  |  10  ----------------------------<  108<H>  3.5   |  22  |  0.76    Ca    8.4      05 Jan 2022 06:25    TPro  6.1  /  Alb  2.8<L>  /  TBili  0.6  /  DBili  0.1  /  AST  91<H>  /  ALT  451<H>  /  AlkPhos  99  01-05

## 2022-01-08 NOTE — DISCHARGE NOTE NURSING/CASE MANAGEMENT/SOCIAL WORK - PATIENT PORTAL LINK FT
You can access the FollowMyHealth Patient Portal offered by [facility] by registering at the following website: [email or web address]. By joining [employer]’s FollowMyHealth portal, you will also be able to view your health information using other applications (apps) compatible with our system.

## 2022-01-08 NOTE — DISCHARGE NOTE PROVIDER - NSDCFUADDINST_GEN_ALL_CORE_FT
Please take tylenol and ibuprofen as needed for pain. Tramadol for pain not controlled by over the counter pain medications

## 2022-01-12 LAB — SURGICAL PATHOLOGY STUDY: SIGNIFICANT CHANGE UP

## 2022-01-19 ENCOUNTER — APPOINTMENT (OUTPATIENT)
Dept: ULTRASOUND IMAGING | Facility: HOSPITAL | Age: 47
End: 2022-01-19

## 2022-01-20 ENCOUNTER — APPOINTMENT (OUTPATIENT)
Dept: SURGERY | Facility: CLINIC | Age: 47
End: 2022-01-20
Payer: COMMERCIAL

## 2022-01-20 VITALS — TEMPERATURE: 97.2 F

## 2022-01-20 DIAGNOSIS — K81.9 CHOLECYSTITIS, UNSPECIFIED: ICD-10-CM

## 2022-01-20 PROCEDURE — 99024 POSTOP FOLLOW-UP VISIT: CPT

## 2022-01-20 NOTE — ASSESSMENT
[FreeTextEntry1] : MARGIE VALDEZ is a 46 year old female who underwent a Laparoscopic cholecystectomy on 01/07/2022 pathology results are consistent with Chronic calculous cholecystitis\par \par \par Patient is doing well. All surgical incisions are healing well and as expected. There is no evidence of infection or complication, and patient is progressing as expected. Post-operative wound care, activity, restrictions and precautions reinforced. path discussed. Patient was instructed no heavy lifting  4-6 weeks. Patient's questions and concerns addressed to patient's satisfaction. \par

## 2022-01-20 NOTE — REASON FOR VISIT
[Post Op: _________] : a [unfilled] post op visit [FreeTextEntry1] : Laparoscopic cholecystectomy on 01/07/2022

## 2022-01-20 NOTE — PLAN
[FreeTextEntry1] : Patient will follow up if needed. Warning signs, follow up, and restrictions were discussed with the patient.

## 2022-01-20 NOTE — PHYSICAL EXAM
[Normal Breath Sounds] : Normal breath sounds [Normal Heart Sounds] : normal heart sounds [Normal Rate and Rhythm] : normal rate and rhythm [No Rash or Lesion] : No rash or lesion [Alert] : alert [Oriented to Person] : oriented to person [Oriented to Place] : oriented to place [Oriented to Time] : oriented to time [Calm] : calm [de-identified] : The patient is alert, well-groomed  [de-identified] : Incision sites are healing well  [de-identified] : full range of motion and no deformities appreciated.

## 2022-01-20 NOTE — HISTORY OF PRESENT ILLNESS
[de-identified] : MARGIE VALDEZ is a 46 year old female who presents in the office for postop visit. Patient was admitted to [facility] with Gallstone Pancreatitis. 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 Today patient is doing well, offers no complaints. Denies any fevers, chills, nausea, vomiting, diarrhea or constipation. Patient able to tolerate regular diet with normal bowel movements. Surgical incisions are healing well. No sign of inflammation or exudate. Patient stated that pain is improved. Patient denies any pain at present time.

## 2022-01-20 NOTE — CONSULT LETTER
[Consult Closing:] : Thank you very much for allowing me to participate in the care of this patient.  If you have any questions, please do not hesitate to contact me. [Sincerely,] : Sincerely, [Dear  ___] : Dear  [unfilled], [FreeTextEntry3] : Lex Jaimes MD\par

## 2022-01-25 ENCOUNTER — RESULT REVIEW (OUTPATIENT)
Age: 47
End: 2022-01-25

## 2022-01-25 ENCOUNTER — APPOINTMENT (OUTPATIENT)
Dept: ULTRASOUND IMAGING | Facility: HOSPITAL | Age: 47
End: 2022-01-25
Payer: COMMERCIAL

## 2022-01-25 ENCOUNTER — OUTPATIENT (OUTPATIENT)
Dept: OUTPATIENT SERVICES | Facility: HOSPITAL | Age: 47
LOS: 1 days | End: 2022-01-25
Payer: COMMERCIAL

## 2022-01-25 DIAGNOSIS — N60.92 UNSPECIFIED BENIGN MAMMARY DYSPLASIA OF LEFT BREAST: ICD-10-CM

## 2022-01-25 DIAGNOSIS — Z98.890 OTHER SPECIFIED POSTPROCEDURAL STATES: Chronic | ICD-10-CM

## 2022-01-25 DIAGNOSIS — E66.9 OBESITY, UNSPECIFIED: Chronic | ICD-10-CM

## 2022-01-25 DIAGNOSIS — Z87.59 PERSONAL HISTORY OF OTHER COMPLICATIONS OF PREGNANCY, CHILDBIRTH AND THE PUERPERIUM: Chronic | ICD-10-CM

## 2022-01-25 DIAGNOSIS — Z98.891 HISTORY OF UTERINE SCAR FROM PREVIOUS SURGERY: Chronic | ICD-10-CM

## 2022-01-25 PROCEDURE — 76642 ULTRASOUND BREAST LIMITED: CPT | Mod: 26,RT

## 2022-01-25 PROCEDURE — 76642 ULTRASOUND BREAST LIMITED: CPT

## 2022-01-25 PROCEDURE — 93970 EXTREMITY STUDY: CPT

## 2022-01-25 PROCEDURE — 93970 EXTREMITY STUDY: CPT | Mod: 26

## 2022-03-29 ENCOUNTER — APPOINTMENT (OUTPATIENT)
Dept: SURGICAL ONCOLOGY | Facility: CLINIC | Age: 47
End: 2022-03-29
Payer: COMMERCIAL

## 2022-03-29 VITALS
SYSTOLIC BLOOD PRESSURE: 128 MMHG | WEIGHT: 172 LBS | DIASTOLIC BLOOD PRESSURE: 83 MMHG | BODY MASS INDEX: 29.37 KG/M2 | HEART RATE: 68 BPM | HEIGHT: 64 IN

## 2022-03-29 VITALS — TEMPERATURE: 98 F

## 2022-03-29 PROCEDURE — 99214 OFFICE O/P EST MOD 30 MIN: CPT

## 2022-03-29 NOTE — HISTORY OF PRESENT ILLNESS
[de-identified] : Ms. Tsai is a 46 year old female who presents today for a follow up visit. \par \par She is s/p left breast YANELY- localized lumpectomy on 20. Final pathology revealed proliferative fibrocystic changes including columnar cell hyperplasia, columnar cell change, stromal fibrosis, microcystformation, adenosis, fibroadenomatoid change and pseudoangiomatous stromal hyperplasia (PASH). Pt. is s/p left posterior upper outer breast stereotactic core biopsy on 11/10/2020.  Pathology revealed Atypical ductal hyperplasia (ADH) in background of columnar cell change and columnar cell hyperplasia associated with calcifications.  Fibrocystic changes including stromal fibrosis, microcyst formation and adenosis associated with microcalcifications.   \par \par Mammogram and US performed on 5/3/21 revealed No mammographic evidence of malignancy. Probably benign 6 mm complicated cyst versus circumscribed hypoechoic mass in the right breast, 10:00 location. Six-month follow-up with targeted right breast ultrasound is recommended, BIRADS 3.\par \par Right breast US on 22 revealed Stable 5 mm well-circumscribed cyst with debris within versus hypoechoic lesion is seen in the right breast 10:00 position 7 cm from the nipple. Continued 6 month sonographic follow-up examination is recommended to ensure stability. At that time, annual bilateral screening mammography can be performed, as well.\par No discrete solid or cystic lesion is noted to correspond to an area of palpable concern in the right breast 10:00 position 5 cm from the nipple. Further evaluation is deferred to clinical parameters, BIRADS 3. \par \par \par PERTINENT HISTORY:\par She was seen in initial consultation in 2019 for a right breast mass.    She went for her annual breast imaging including mammo/sono 2018 and was noted with a 0.6cm irregular nodular density at the 2:00 position Right breast for which US guided biopsy was recommended (Birads 4).  She is s/p right breast 2:00, 3cmfn US guided core biopsy on 2020- Path: Benign breast tissue with dense fibrosis.   \par \par Bilateral mammogram/sonogram 2020- IMPRESSION:  1). Developing nodular asymmetry far upper posterior left breast, likely corresponding to one of the new deeply located complicated cyst left breast on ultrasound at 1-2:00 or 2:00. Further evaluation by ultrasound-guided cyst aspirations and postprocedure mammogram to ascertain correlation is recommended. If correlation is not established, then tomosynthesis guided stereotactic core biopsy is recommended for the mammographic asymmetry.  2) Probable benign loosely grouped calcifications left upper outer posterior breast, for which a six-month follow-up left diagnostic mammogram recommended to ascertain radiographic stability.  3.) Probable benign cluster of microcysts left breast at 2:00, for which a six-month follow-up ultrasound recommended to ascertain stability.  RECOMMENDATION:  Ultrasound biopsy.  BI-RADS 4A  - Suspicious Finding(s) -Low Suspicion for Malignancy\par \par She is s/p left breast 2:00 FNA on 2020 -Path: Negative for malignant cells; compatible with apocrine metaplastic cyst contents. \par \par Diagnostic left mammogram/sonogram 2020- Interval increase in calcifications within the deep posterior left upper outer quadrant, biopsy is advised.  BIRADS 4A\par \par PMH otherwise significant for hypothyroidism.\par Denies family history of breast or ovarian cancer.  Father with history of gastric cancer.\par \par Menarche: 10 y.o.\par  \par Age at first pregnancy: 26 y.o.\par \par Bilateral/Mammo 5/3/21: The breast parenchyma demonstrates a heterogeneous background echotexture (mixed fatty and fibroglandular). 1:00, 8 cm from the nipple, postsurgical scarring. Few scattered cysts, measuring up to 3 mm at 7:00, 1 cm from the nipple. No suspicious solid mass, BI-RADS 3.\par \par Today, she denies palpable breast masses, nipple discharge, skin changes, inversion or breast pain. Denies constitutional symptoms.\par

## 2022-03-29 NOTE — PHYSICAL EXAM
[Normal] : supple, no neck mass and thyroid not enlarged [Normal Neck Lymph Nodes] : normal neck lymph nodes  [Normal Supraclavicular Lymph Nodes] : normal supraclavicular lymph nodes [Normal Axillary Lymph Nodes] : normal axillary lymph nodes [Normal] : oriented to person, place and time, with appropriate affect [de-identified] : fibrocystic changes but no masses

## 2022-03-29 NOTE — ASSESSMENT
[FreeTextEntry1] : Impression:\par Increasing Left breast calcifications on breast imaging\par S/p stereotactic core bx 11/5/2020- Path: ADH\par s/p left breast YANELY- localized lumpectomy on 12/2/20.\par  PATH: proliferative fibrocystic changes including columnar cell hyperplasia, columnar cell change, stromal fibrosis, microcystformation, adenosis, fibroadenomatoid change and pseudoangiomatous stromal hyperplasia (PASH).\par therefore negative margins around original ADH\par Bilateral Mammo/Sono 5/3/21: BI-RADS 3.\par \par 1/25/22 right breast US BIRADS 3. \par \par Plan:\par RTO 6 months  after bilateral mammo and sono June 2022\par

## 2022-06-07 NOTE — ASU PREOPERATIVE ASSESSMENT, ADULT ([facility] ONLY) - INV PERIPH IV SIZE
20 gauge Drysol Pregnancy And Lactation Text: This medication is considered safe during pregnancy and breast feeding.

## 2022-08-05 NOTE — ED ADULT NURSE NOTE - IS THE PATIENT ABLE TO BE SCREENED?
ADVANCED HF PROGRESS NOTE    Dylon Mccain  1944 08/05/22        Impression  77 year old      - Severe MR s/p MitraClip and development of perforation of posterior leaflet - now s/p MVR with 33mm [employer] bioprosthesis and ASD repair on 7/20/22  - Cardiogenic Shock - resolved  - Afib and bradycardia; now s/p PPM 7/26  - Acute hypoxic resp failure: resolved, s/p extubation 7/22, thora -1500 cc   - Small subdural hematoma   - CABG 2009 / CAD  - HLD  - Acute on Chronic HFpEF  - PFO/ASD, small stepup from SVC to PA  - CKD  - DM  - Chronic Anemia / thrombocytopenia, [employer] light chain present.   - Relative ATIII deficiency   - Acute on chronic renal insufficiency     Last [place] readings: 7/23:  RA: 8-15  PA: 63/22  CO/CI: 7.1 / 3.9   SVR : 653     Plan:  Severe MR, s/p unsuccessful mitral clip, redo sternotomy for bio-MVR  - will need warfarin therapy for 3 months.   - will need to hold for 2 weeks prior to starting given recent subdural hematoma  - ASA on hold due to platelet issues.      Subdural hematoma  - incidental finding  - holding a/c for 2 weeks     Acute on chronic HFpEF  - Volume is elevated, prior dry weight reportedly 146 lbs. Weight today 159 lbs  -  2L total, thora recently with 1.4L removed.   - Remains on high oxygen requirements  - Recommend reducing and weaning sildenafil  - Started acetazolamide for contraction alkalosis  - Continue diuresis    PHTN  - PVR 1.2 MORALES, Low DPG, though responsive to NO and sildenafil   - Secondary due to congestion   - Responded to sildenafil which was utilized more for RV support and weaning off NO  - Will plan to wean sildenafil    Anemia, thrombocytopenia  - improved plat  - postoperative anemia      RECC  - Decreased sildenafil 10 bid, keep at this dose for few days  - Decrease Midodrine to 5mg TID today  - BB started for CAD, statin ongoing. ASA on hold.   - Weigh at 159 lbs - dry weight close to 146 lbs previously, though different weight scales.  Clinically appears euvolemic.   - failed video swallow. Plan PEG today    - pleural effusions better post thora, still with LLL consolidation, possible aspiration  - echo with IVC dilated, estimated CVP 15, RVSP 45 mmHG, correlating to increase in weight.    - continue IV lasix  - wound care consult for LE wounds.   - cont PT/OT / speech eval.       Plan discussed with nursing staff      JOEL Sheth  [facility]  Heart Failure Service    ADDENDUM:  Patient seen and examined by me independently. Chart reviewed. No chest pain. Feels like Sob is stable.  BP stable. Exam wise heart sounds RRR S1S2, decreased breath sounds bL, no JVD, no LE edema  Agree with plan above  Per review of echo still does appear to be volume overloaded with increased filling pressures. Recommend continuing IV lasix 40 mg BID  Continue middorine - reduced to 5 mg TID  Continue sildenafil 10 mg TID for RV support  Plan for PEG today    Plan discussed with nursing staff    Severiano Marroquin MD  Advanced Heart Failure  [facility]    S:   No new events overnight. Plan for PEG today.    O:   Blood pressure 119/64, pulse 71, temperature 97.9 °F (36.6 °C), temperature source Oral, resp. rate 14, height 5' 9\" (1.753 m), weight (!) 162 kg (357 lb 2.3 oz), SpO2 98 %.      Vitals:    08/05/22 0304 08/05/22 0334 08/05/22 0536 08/05/22 0719   BP: 114/58   119/64   BP Location: LUE - Left upper extremity   RUE - Right upper extremity   Patient Position: Supine   Supine   Pulse: 71 70  71   Resp: 16 14  14   Temp:    97.9 °F (36.6 °C)   TempSrc:    Oral   SpO2: 100% 99%  98%   Weight:   (!) 162 kg (357 lb 2.3 oz)    Height:             Intake/Output Summary (Last 24 hours) at 8/5/2022 0851  Last data filed at 8/5/2022 0700  Gross per 24 hour   Intake --   Output 1400 ml   Net -1400 ml       I/O last 3 completed shifts:  In: -   Out: 800 [Urine:800]    Wt Readings from Last 6 Encounters:   08/05/22 (!) 162 kg (357 lb 2.3 oz)   07/06/22 81.3 kg  (179 lb 3.7 oz)   05/23/22 80.3 kg (177 lb 0.5 oz)   04/14/22 77.6 kg (171 lb 1.2 oz)          Exam:  Gen: alert, comfortable  HEENT: MMM, dobhoff in place   Neck: no JVD  Heart: RRR S1S2  Lungs: decreased BS  Abd: soft, NT  Ext: no edema  Neuro: a,ox3  Psych: pleasant  Skin: warm, dry      Recent Labs   Lab 08/04/22 0429 08/03/22 0438 08/02/22  0436   WBC 4.9 5.6 6.6   HGB 7.7* 8.0* 8.4*   HCT 26.1* 26.4* 28.6*   * 142 145       Recent Labs   Lab 08/05/22 0421 08/04/22 0429 08/03/22 0438   SODIUM 140 137 139   POTASSIUM 4.3 3.9 3.9   CO2 35* 35* 36*   BUN 73* 71* 71*   CREATININE 1.15 1.24* 1.23*       No results found          Current Facility-Administered Medications   Medication Dose Route Frequency Provider Last Rate Last Admin   • potassium CHLORIDE (KLOR-CON M) viviane ER tablet 40 mEq  40 mEq Oral Once Shilpa M Henny, DO       • sildenafil (REVATIO) tablet 10 mg  10 mg Per NG tube BID Favio Barillas MD   10 mg at 08/05/22 0815   • midodrine (PROAMATINE) tablet 7.5 mg  7.5 mg Per NG tube 3 times per day Favio Barillas MD   7.5 mg at 08/05/22 0646   • metoPROLOL tartrate (LOPRESSOR) tablet 12.5 mg  12.5 mg Oral 2 times per day Stefano Vale MD   12.5 mg at 08/05/22 0815   • furosemide (LASIX INJECT) injection 40 mg  40 mg Intravenous BID Glenn Terry PA-C   40 mg at 08/05/22 0815   • heparin (porcine) injection 5,000 Units  5,000 Units Subcutaneous 3 times per day Tushar Angulo MD   5,000 Units at 08/04/22 2121   • OLANZapine (ZyPREXA) tablet 10 mg  10 mg Per NG tube Nightly Theodore Gallardo MD   10 mg at 08/04/22 2001   • atorvastatin (LIPITOR) tablet 10 mg  10 mg Per NG tube Nightly Luh Lucia PA-C   10 mg at 08/04/22 2001   • docusate sodium-sennosides (SENOKOT S) 50-8.6 MG 2 tablet  2 tablet Per NG tube Daily Luh Lucia PA-C   2 tablet at 08/04/22 0903   • folic acid (FOLATE) tablet 1 mg  1 mg Per NG tube Daily Colby Martinez MD   1 mg at 08/05/22 0815   • thiamine (VITAMIN B1)  tablet 100 mg  100 mg Per NG tube Daily Colby Martinez MD   100 mg at 08/05/22 0815   • pantoprazole (PROTONIX) 40 MG/20ML (compounded) suspension 40 mg  40 mg Per NG tube [facility] Cameron Terrell MD   40 mg at 08/05/22 0648   • Phosphorus Standard Replacement Protocol   Does not apply See Admin Instructions Juarez Abbott MD       • Potassium Replacement (Levels 3.6 - 4)   Does not apply See Admin Instructions Juarez Abbott MD       • Magnesium Standard Replacement Protocol   Does not apply See Admin Instructions Cameron Terrell MD               Patient Active Problem List   Diagnosis   • Atrial fibrillation (CMS/HCC)   • Severe mitral regurgitation   • Mitral valve regurgitation due to prolapse of cusp        Yes

## 2022-08-29 ENCOUNTER — APPOINTMENT (OUTPATIENT)
Dept: MAMMOGRAPHY | Facility: CLINIC | Age: 47
End: 2022-08-29
Payer: COMMERCIAL

## 2022-08-29 ENCOUNTER — APPOINTMENT (OUTPATIENT)
Dept: ULTRASOUND IMAGING | Facility: CLINIC | Age: 47
End: 2022-08-29
Payer: COMMERCIAL

## 2022-08-29 ENCOUNTER — RESULT REVIEW (OUTPATIENT)
Age: 47
End: 2022-08-29

## 2022-08-29 ENCOUNTER — OUTPATIENT (OUTPATIENT)
Dept: OUTPATIENT SERVICES | Facility: HOSPITAL | Age: 47
LOS: 1 days | End: 2022-08-29
Payer: COMMERCIAL

## 2022-08-29 DIAGNOSIS — Z98.890 OTHER SPECIFIED POSTPROCEDURAL STATES: Chronic | ICD-10-CM

## 2022-08-29 DIAGNOSIS — E66.9 OBESITY, UNSPECIFIED: Chronic | ICD-10-CM

## 2022-08-29 DIAGNOSIS — Z98.891 HISTORY OF UTERINE SCAR FROM PREVIOUS SURGERY: Chronic | ICD-10-CM

## 2022-08-29 DIAGNOSIS — Z87.59 PERSONAL HISTORY OF OTHER COMPLICATIONS OF PREGNANCY, CHILDBIRTH AND THE PUERPERIUM: Chronic | ICD-10-CM

## 2022-08-29 DIAGNOSIS — N63.10 UNSPECIFIED LUMP IN THE RIGHT BREAST, UNSPECIFIED QUADRANT: ICD-10-CM

## 2022-08-29 PROCEDURE — G0279: CPT | Mod: 26

## 2022-08-29 PROCEDURE — 76641 ULTRASOUND BREAST COMPLETE: CPT | Mod: 26,50

## 2022-08-29 PROCEDURE — 77066 DX MAMMO INCL CAD BI: CPT | Mod: 26

## 2022-08-29 PROCEDURE — 76641 ULTRASOUND BREAST COMPLETE: CPT

## 2022-08-29 PROCEDURE — 76536 US EXAM OF HEAD AND NECK: CPT | Mod: 26

## 2022-08-29 PROCEDURE — 76536 US EXAM OF HEAD AND NECK: CPT

## 2022-08-29 PROCEDURE — 77066 DX MAMMO INCL CAD BI: CPT

## 2022-08-29 PROCEDURE — G0279: CPT

## 2022-10-10 PROBLEM — N63.10 BREAST MASS, RIGHT: Status: ACTIVE | Noted: 2019-01-23

## 2022-10-11 ENCOUNTER — APPOINTMENT (OUTPATIENT)
Dept: SURGICAL ONCOLOGY | Facility: CLINIC | Age: 47
End: 2022-10-11

## 2022-10-11 VITALS — TEMPERATURE: 98.8 F

## 2022-10-11 DIAGNOSIS — N63.10 UNSPECIFIED LUMP IN THE RIGHT BREAST, UNSPECIFIED QUADRANT: ICD-10-CM

## 2022-10-11 PROCEDURE — 99214 OFFICE O/P EST MOD 30 MIN: CPT

## 2022-10-11 NOTE — ASSESSMENT
[FreeTextEntry1] : IMP:\par s/p left breast YANELY- localized lumpectomy on 12/2/20 for ADH\par \par Bilateral Mammo/Sono 5/3/21: BI-RADS 3.\par 1/25/22 right breast US BIRADS 3. \par \par B/L mammo/sono 8/2022- BIRADS 2\par \par Plan:\par RTO yearly\par mammo/sono 8/2022\par

## 2022-10-11 NOTE — PHYSICAL EXAM
[Normal] : supple, no neck mass and thyroid not enlarged [Normal Neck Lymph Nodes] : normal neck lymph nodes  [Normal Supraclavicular Lymph Nodes] : normal supraclavicular lymph nodes [Normal Axillary Lymph Nodes] : normal axillary lymph nodes [Normal] : oriented to person, place and time, with appropriate affect [de-identified] : fibrocystic changes but no masses

## 2022-10-11 NOTE — HISTORY OF PRESENT ILLNESS
[de-identified] : Ms. Tsai is a 47 year old female who presents today for a follow up visit. \par \par She is s/p left breast YANELY- localized lumpectomy on 20 for ADH. Final pathology revealed proliferative fibrocystic changes including columnar cell hyperplasia, columnar cell change, stromal fibrosis, microcyst formation, adenosis, fibroadenomatoid change and pseudoangiomatous stromal hyperplasia (PASH). \par \par \par Right breast US on 22 revealed Stable 5 mm well-circumscribed cyst with debris within versus hypoechoic lesion is seen in the right breast 10:00 position 7 cm from the nipple. Continued 6 month sonographic follow-up examination is recommended to ensure stability. At that time, annual bilateral screening mammography can be performed, as well.\par No discrete solid or cystic lesion is noted to correspond to an area of palpable concern in the right breast 10:00 position 5 cm from the nipple. Further evaluation is deferred to clinical parameters, BIRADS 3. \par \par \par B/L mammo/sono 2022- BIRADS 2\par \par \par \par PMH otherwise significant for hypothyroidism.\par Denies family history of breast or ovarian cancer.  Father with history of gastric cancer.\par Menarche: 10 y.o.\par  \par Age at first pregnancy: 26 y.o.

## 2022-10-14 NOTE — ED ADULT NURSE NOTE - TEMPLATE LIST FOR HEAD TO TOE ASSESSMENT
The form was sent to the Physician's Nurse MSG Pool via In-Basket for review and signature.    Completed form will be faxed to Iwona Katz at [employer] at [phone].  Authorization not required for Worker's Compensation cases.      [place]

## 2022-11-21 NOTE — ED ADULT TRIAGE NOTE - NS ED NURSE DIRECT TO ROOM YN
Yes Abbe Flap (Upper To Lower Lip) Text: The defect of the lower lip was assessed and measured.  Given the location and size of the defect, an Abbe flap was deemed most appropriate.  Using a sterile surgical marker, an appropriate Abbe flap was measured and drawn on the upper lip. Local anesthesia was then infiltrated.  A scalpel was then used to incise the upper lip through and through the skin, vermilion, muscle and mucosa, leaving the flap pedicled on the opposite side.  The flap was then rotated and transferred to the lower lip defect.  The flap was then sutured into place with a three layer technique, closing the orbicularis oris muscle layer with subcutaneous buried sutures, followed by a mucosal layer and an epidermal layer.

## 2023-01-03 NOTE — ED ADULT NURSE NOTE - PAIN RATING/NUMBER SCALE (0-10): ACTIVITY
Your work-up today was reassuring. Your EKG was without acute abnormalities. You chose to refuse CAT scan today. Your urine is clean. You may utilize Uribel as needed for lingering dysuria. Return here for any new or worsening symptoms. Otherwise please follow-up with family doctor    As we discussed, I did not find a life threatening cause of your symptoms today. However, THAT DOES NOT MEAN IT COULD NOT DEVELOP. If you develop ANY new or worsening symptoms, it is critical that you return for re-evaluation. This includes any symptoms that are concerning to you, especially symptoms such as fevers, headache, dizziness, chest pain, trouble breathing. If you do not return for re-evaluation, you risk serious complications, including death.
5

## 2023-05-08 NOTE — ED ADULT NURSE NOTE - PT NEEDS ASSIST
Call received from daughter Jeannette requesting follow-up on urgent refill request for Carvedilol 25mg. Per Jeannette, refill sent on 4/28 did not indicate which pharmacy. Daughter would like Rx sent to:    [facility]  [address] [place] [address]    Dtr states pt has missed 2 doses and is requesting refill to be sent to [facility] today. Perfect Serve msg sent to ELISSA Wright.  
no

## 2023-06-20 NOTE — ED PROVIDER NOTE - INPATIENT RESIDENT/ACP NOTIFIED DATE
Face And Scalp Incubation Time: 1 Hour for the face and 2 Hours for the scalp Location: Face Hands Incubation Time: 2 Hours Face, Ears And  Scalp Incubation Time: 1 Hour Frequency Of Pdt: Three treatments 4 weeks apart Occlusion: No Pdt Type: MAUDE-U Detail Level: Simple Photosensitizer: Levulan Consent: The procedure and risks were reviewed with the patient including but not limited to: burning, pigmentary changes, pain, blistering, scabbing, redness, and the possibility of needing numerous treatments. Strict photoprotection after the procedure was also discussed. Location: Chest 04-Jan-2022 17:10

## 2023-09-11 PROBLEM — N64.89 PSEUDOANGIOMATOUS STROMAL HYPERPLASIA OF BREAST: Status: ACTIVE | Noted: 2020-12-29

## 2023-09-12 ENCOUNTER — APPOINTMENT (OUTPATIENT)
Dept: SURGICAL ONCOLOGY | Facility: CLINIC | Age: 48
End: 2023-09-12
Payer: COMMERCIAL

## 2023-09-12 DIAGNOSIS — N64.89 OTHER SPECIFIED DISORDERS OF BREAST: ICD-10-CM

## 2023-09-26 ENCOUNTER — OUTPATIENT (OUTPATIENT)
Dept: OUTPATIENT SERVICES | Facility: HOSPITAL | Age: 48
LOS: 1 days | End: 2023-09-26
Payer: COMMERCIAL

## 2023-09-26 ENCOUNTER — APPOINTMENT (OUTPATIENT)
Dept: MAMMOGRAPHY | Facility: HOSPITAL | Age: 48
End: 2023-09-26
Payer: COMMERCIAL

## 2023-09-26 ENCOUNTER — RESULT REVIEW (OUTPATIENT)
Age: 48
End: 2023-09-26

## 2023-09-26 ENCOUNTER — APPOINTMENT (OUTPATIENT)
Dept: ULTRASOUND IMAGING | Facility: HOSPITAL | Age: 48
End: 2023-09-26
Payer: COMMERCIAL

## 2023-09-26 DIAGNOSIS — Z98.891 HISTORY OF UTERINE SCAR FROM PREVIOUS SURGERY: Chronic | ICD-10-CM

## 2023-09-26 DIAGNOSIS — E66.9 OBESITY, UNSPECIFIED: Chronic | ICD-10-CM

## 2023-09-26 DIAGNOSIS — N64.89 OTHER SPECIFIED DISORDERS OF BREAST: ICD-10-CM

## 2023-09-26 DIAGNOSIS — Z98.890 OTHER SPECIFIED POSTPROCEDURAL STATES: Chronic | ICD-10-CM

## 2023-09-26 DIAGNOSIS — Z87.59 PERSONAL HISTORY OF OTHER COMPLICATIONS OF PREGNANCY, CHILDBIRTH AND THE PUERPERIUM: Chronic | ICD-10-CM

## 2023-09-26 PROCEDURE — 76641 ULTRASOUND BREAST COMPLETE: CPT | Mod: 26,50

## 2023-09-26 PROCEDURE — 77067 SCR MAMMO BI INCL CAD: CPT | Mod: 26

## 2023-09-26 PROCEDURE — 77063 BREAST TOMOSYNTHESIS BI: CPT | Mod: 26

## 2023-09-26 PROCEDURE — 76641 ULTRASOUND BREAST COMPLETE: CPT

## 2023-09-26 PROCEDURE — 76536 US EXAM OF HEAD AND NECK: CPT

## 2023-09-26 PROCEDURE — 76536 US EXAM OF HEAD AND NECK: CPT | Mod: 26

## 2023-09-26 PROCEDURE — 77063 BREAST TOMOSYNTHESIS BI: CPT

## 2023-09-26 PROCEDURE — 77067 SCR MAMMO BI INCL CAD: CPT

## 2023-10-10 ENCOUNTER — APPOINTMENT (OUTPATIENT)
Dept: SURGICAL ONCOLOGY | Facility: CLINIC | Age: 48
End: 2023-10-10
Payer: COMMERCIAL

## 2023-10-10 ENCOUNTER — RESULT REVIEW (OUTPATIENT)
Age: 48
End: 2023-10-10

## 2023-10-10 VITALS
SYSTOLIC BLOOD PRESSURE: 145 MMHG | DIASTOLIC BLOOD PRESSURE: 91 MMHG | OXYGEN SATURATION: 99 % | RESPIRATION RATE: 16 BRPM | BODY MASS INDEX: 29.37 KG/M2 | WEIGHT: 172 LBS | HEIGHT: 64 IN | HEART RATE: 71 BPM

## 2023-10-10 PROCEDURE — 99214 OFFICE O/P EST MOD 30 MIN: CPT

## 2024-03-18 ENCOUNTER — RESULT REVIEW (OUTPATIENT)
Age: 49
End: 2024-03-18

## 2024-03-18 ENCOUNTER — APPOINTMENT (OUTPATIENT)
Dept: ULTRASOUND IMAGING | Facility: CLINIC | Age: 49
End: 2024-03-18
Payer: COMMERCIAL

## 2024-03-18 ENCOUNTER — OUTPATIENT (OUTPATIENT)
Dept: OUTPATIENT SERVICES | Facility: HOSPITAL | Age: 49
LOS: 1 days | End: 2024-03-18
Payer: COMMERCIAL

## 2024-03-18 DIAGNOSIS — Z87.59 PERSONAL HISTORY OF OTHER COMPLICATIONS OF PREGNANCY, CHILDBIRTH AND THE PUERPERIUM: Chronic | ICD-10-CM

## 2024-03-18 DIAGNOSIS — Z98.891 HISTORY OF UTERINE SCAR FROM PREVIOUS SURGERY: Chronic | ICD-10-CM

## 2024-03-18 DIAGNOSIS — N60.92 UNSPECIFIED BENIGN MAMMARY DYSPLASIA OF LEFT BREAST: ICD-10-CM

## 2024-03-18 DIAGNOSIS — E66.9 OBESITY, UNSPECIFIED: Chronic | ICD-10-CM

## 2024-03-18 DIAGNOSIS — Z98.890 OTHER SPECIFIED POSTPROCEDURAL STATES: Chronic | ICD-10-CM

## 2024-03-18 PROCEDURE — 76642 ULTRASOUND BREAST LIMITED: CPT | Mod: 26,LT

## 2024-03-18 PROCEDURE — 76642 ULTRASOUND BREAST LIMITED: CPT

## 2024-03-19 ENCOUNTER — APPOINTMENT (OUTPATIENT)
Dept: SURGICAL ONCOLOGY | Facility: CLINIC | Age: 49
End: 2024-03-19
Payer: COMMERCIAL

## 2024-03-19 VITALS
HEART RATE: 61 BPM | OXYGEN SATURATION: 99 % | WEIGHT: 172 LBS | BODY MASS INDEX: 29.37 KG/M2 | SYSTOLIC BLOOD PRESSURE: 137 MMHG | DIASTOLIC BLOOD PRESSURE: 83 MMHG | HEIGHT: 64 IN

## 2024-03-19 DIAGNOSIS — N60.92 UNSPECIFIED BENIGN MAMMARY DYSPLASIA OF LEFT BREAST: ICD-10-CM

## 2024-03-19 PROCEDURE — 99214 OFFICE O/P EST MOD 30 MIN: CPT

## 2024-03-19 NOTE — ASSESSMENT
[FreeTextEntry1] : IMP: s/p left breast YANELY- localized lumpectomy on 12/2/20 for ADH  B/L mammo/sono 9/2023 - BIRADS 3 Left sono 3/2024: no change in cluster of cyst - BIRADS 2  PLAN: B/L mammo/sono 9/2024  RTO in 6 months

## 2024-03-19 NOTE — HISTORY OF PRESENT ILLNESS
[de-identified] : Ms. Tsai is a 48 year old female who presents today for a follow up visit.   She is s/p left breast YANELY- localized lumpectomy on 20 for ADH, final path fibrocystic changes & PASH  B/L mammo/sono 2022- BIRADS 2  B/L mammo/sono 2023 - 1.4 cm cluster of cyst to left breast 2:00 N7, 6-month F/U LEFT U/S, BI-RADS 3 f/u left U/S 3/18/2024 -- no change in cluster of cysts  PMH otherwise significant for hypothyroidism. Denies family history of breast or ovarian cancer.  Father with history of gastric cancer. Menarche: 10 y.o.   Age at first pregnancy: 26 y.o.

## 2024-03-19 NOTE — CONSULT LETTER
[Dear  ___] : Dear  [unfilled], [Courtesy Letter:] : I had the pleasure of seeing your patient, [unfilled], in my office today. [Consult Closing:] : Thank you very much for allowing me to participate in the care of this patient.  If you have any questions, please do not hesitate to contact me. [Please see my note below.] : Please see my note below. [Sincerely,] : Sincerely, [FreeTextEntry1] : I will keep you informed of her annual imaging in September. [FreeTextEntry3] : Lei Lucero MD FACS\par  Chief of Surgical Oncology\par  \par

## 2024-03-19 NOTE — PHYSICAL EXAM
[Normal] : supple, no neck mass and thyroid not enlarged [Normal Neck Lymph Nodes] : normal neck lymph nodes  [Normal Supraclavicular Lymph Nodes] : normal supraclavicular lymph nodes [Normal Axillary Lymph Nodes] : normal axillary lymph nodes [Normal] : oriented to person, place and time, with appropriate affect [de-identified] : fibrocystic changes but no masses

## 2024-10-12 ENCOUNTER — APPOINTMENT (OUTPATIENT)
Dept: MAMMOGRAPHY | Facility: CLINIC | Age: 49
End: 2024-10-12
Payer: COMMERCIAL

## 2024-10-12 ENCOUNTER — APPOINTMENT (OUTPATIENT)
Dept: ULTRASOUND IMAGING | Facility: CLINIC | Age: 49
End: 2024-10-12
Payer: COMMERCIAL

## 2024-10-12 ENCOUNTER — RESULT REVIEW (OUTPATIENT)
Age: 49
End: 2024-10-12

## 2024-10-12 PROCEDURE — 76536 US EXAM OF HEAD AND NECK: CPT

## 2024-10-12 PROCEDURE — 76641 ULTRASOUND BREAST COMPLETE: CPT | Mod: 50

## 2024-10-12 PROCEDURE — 77063 BREAST TOMOSYNTHESIS BI: CPT

## 2024-10-12 PROCEDURE — 77067 SCR MAMMO BI INCL CAD: CPT

## 2024-10-15 ENCOUNTER — NON-APPOINTMENT (OUTPATIENT)
Age: 49
End: 2024-10-15

## 2024-10-15 ENCOUNTER — APPOINTMENT (OUTPATIENT)
Dept: SURGICAL ONCOLOGY | Facility: CLINIC | Age: 49
End: 2024-10-15
Payer: COMMERCIAL

## 2024-10-15 VITALS
WEIGHT: 186 LBS | HEIGHT: 64 IN | HEART RATE: 62 BPM | SYSTOLIC BLOOD PRESSURE: 144 MMHG | DIASTOLIC BLOOD PRESSURE: 87 MMHG | OXYGEN SATURATION: 99 % | BODY MASS INDEX: 31.76 KG/M2

## 2024-10-15 DIAGNOSIS — N60.92 UNSPECIFIED BENIGN MAMMARY DYSPLASIA OF LEFT BREAST: ICD-10-CM

## 2024-10-15 DIAGNOSIS — N63.10 UNSPECIFIED LUMP IN THE RIGHT BREAST, UNSPECIFIED QUADRANT: ICD-10-CM

## 2024-10-15 PROCEDURE — 99214 OFFICE O/P EST MOD 30 MIN: CPT

## 2025-05-28 NOTE — ED ADULT TRIAGE NOTE - PATIENT ON (OXYGEN DELIVERY METHOD)
Continue current medications, no changes  Schedule echo with next office visit  Fasting labs (FASTING for 8-10 hours) with next office visit: CBC, CMP, BNP, and LIPID  Follow up with Tashia Jones MD in 6 months  Call my office for any worsening symptoms such as shortness of breath, chest pain, sudden weight gain or loss, or any increased swelling: Phone: [phone] or Fax: [phone]   room air

## 2025-09-21 ENCOUNTER — NON-APPOINTMENT (OUTPATIENT)
Age: 50
End: 2025-09-21

## 2025-09-25 PROBLEM — S92.511A CLOSED FRACTURE OF PROXIMAL PHALANX OF LESSER TOE OF RIGHT FOOT, INITIAL ENCOUNTER: Status: ACTIVE | Noted: 2025-09-25

## (undated) DEVICE — NDL INSUFFLATION SURGINEEDLE 120MM

## (undated) DEVICE — BLANKET WARMER UPPER ADULT

## (undated) DEVICE — SUT SURGIPRO 1 30" GS-21

## (undated) DEVICE — TROCAR COVIDIEN VERSAONE BLADED 11MM STD

## (undated) DEVICE — DRAIN JACKSON PRATT 10MM FLAT 3/4 NO TROCAR

## (undated) DEVICE — BLADE SURGICAL #15 CARBON

## (undated) DEVICE — PRECISION CUT SCISSOR MICROLINE MINI ENDOCUT DISP

## (undated) DEVICE — SUT POLYSORB 0 30" GU-46

## (undated) DEVICE — TUBING STRYKER PNEUMOSURE HEATED RTP

## (undated) DEVICE — TUBING STRYKEFLOW II SUCTION / IRRIGATOR

## (undated) DEVICE — DRAIN RESERVOIR FOR JACKSON PRATT 100CC CARDINAL

## (undated) DEVICE — DRSG BANDAID 0.75X3"

## (undated) DEVICE — DRAPE C ARM UNIVERSAL

## (undated) DEVICE — SPONGE ENDO PEANUT 5MM

## (undated) DEVICE — PACK GENERAL LAPAROSCOPY

## (undated) DEVICE — GLV 7 ESTEEM BLUE

## (undated) DEVICE — DRSG GAUZE PETROLEUM 3X9"

## (undated) DEVICE — WRAP COMPRESSION CALF MED

## (undated) DEVICE — ELCTR FOOT CONTROL L WIRE LAPAROSCOPIC

## (undated) DEVICE — SOL IRR POUR NS 0.9% 1500ML

## (undated) DEVICE — DRSG MASTISOL

## (undated) DEVICE — SUT POLYSORB 4-0 27" P-12 UNDYED

## (undated) DEVICE — SYR LUER LOK 10CC

## (undated) DEVICE — ELCTR GROUNDING PAD ADULT COVIDIEN

## (undated) DEVICE — DRAPE HALF SHEET 40X57"

## (undated) DEVICE — D HELP - CLEARVIEW CLEARIFY SYSTEM

## (undated) DEVICE — FOR-ESU VALLEYLAB T7E15008DX: Type: DURABLE MEDICAL EQUIPMENT

## (undated) DEVICE — DRAPE LIGHT HANDLE COVER BLUE

## (undated) DEVICE — TROCAR COVIDIEN VERSAONE BLADED SMOOTH 5MM

## (undated) DEVICE — NDL HYPO SAFE 25G X 1.5"

## (undated) DEVICE — GLV 7 PROTEXIS